# Patient Record
Sex: FEMALE | Race: WHITE | NOT HISPANIC OR LATINO | Employment: OTHER | URBAN - METROPOLITAN AREA
[De-identification: names, ages, dates, MRNs, and addresses within clinical notes are randomized per-mention and may not be internally consistent; named-entity substitution may affect disease eponyms.]

---

## 2023-12-13 ENCOUNTER — HOSPITAL ENCOUNTER (INPATIENT)
Facility: HOSPITAL | Age: 83
LOS: 5 days | Discharge: HOSPICE | DRG: 177 | End: 2023-12-18
Attending: EMERGENCY MEDICINE | Admitting: STUDENT IN AN ORGANIZED HEALTH CARE EDUCATION/TRAINING PROGRAM
Payer: MEDICARE

## 2023-12-13 ENCOUNTER — APPOINTMENT (OUTPATIENT)
Dept: RADIOLOGY | Facility: HOSPITAL | Age: 83
DRG: 177 | End: 2023-12-13
Payer: MEDICARE

## 2023-12-13 ENCOUNTER — ANCILLARY PROCEDURE (OUTPATIENT)
Dept: EMERGENCY MEDICINE | Facility: HOSPITAL | Age: 83
DRG: 177 | End: 2023-12-13
Payer: MEDICARE

## 2023-12-13 DIAGNOSIS — J18.9 MULTIFOCAL PNEUMONIA: Primary | ICD-10-CM

## 2023-12-13 DIAGNOSIS — Z96.1 PSEUDOPHAKIA OF BOTH EYES: ICD-10-CM

## 2023-12-13 DIAGNOSIS — M17.0 PRIMARY OSTEOARTHRITIS OF BOTH KNEES: ICD-10-CM

## 2023-12-13 DIAGNOSIS — J96.01 ACUTE HYPOXEMIC RESPIRATORY FAILURE (MULTI): ICD-10-CM

## 2023-12-13 DIAGNOSIS — K59.09 OTHER CONSTIPATION: ICD-10-CM

## 2023-12-13 LAB
ALBUMIN SERPL BCP-MCNC: 2.9 G/DL (ref 3.4–5)
ALP SERPL-CCNC: 78 U/L (ref 33–136)
ALT SERPL W P-5'-P-CCNC: 15 U/L (ref 7–45)
ANION GAP BLDV CALCULATED.4IONS-SCNC: 14 MMOL/L (ref 10–25)
ANION GAP SERPL CALC-SCNC: 17 MMOL/L (ref 10–20)
AST SERPL W P-5'-P-CCNC: 24 U/L (ref 9–39)
BASE EXCESS BLDV CALC-SCNC: -4 MMOL/L (ref -2–3)
BASOPHILS # BLD MANUAL: 0 X10*3/UL (ref 0–0.1)
BASOPHILS NFR BLD MANUAL: 0 %
BILIRUB SERPL-MCNC: 0.8 MG/DL (ref 0–1.2)
BNP SERPL-MCNC: 200 PG/ML (ref 0–99)
BODY TEMPERATURE: 37 DEGREES CELSIUS
BUN SERPL-MCNC: 39 MG/DL (ref 6–23)
BURR CELLS BLD QL SMEAR: ABNORMAL
CA-I BLDV-SCNC: 1.16 MMOL/L (ref 1.1–1.33)
CALCIUM SERPL-MCNC: 8.8 MG/DL (ref 8.6–10.6)
CARDIAC TROPONIN I PNL SERPL HS: 8 NG/L (ref 0–34)
CHLORIDE BLDV-SCNC: 102 MMOL/L (ref 98–107)
CHLORIDE SERPL-SCNC: 103 MMOL/L (ref 98–107)
CO2 SERPL-SCNC: 20 MMOL/L (ref 21–32)
CREAT SERPL-MCNC: 1.15 MG/DL (ref 0.5–1.05)
EOSINOPHIL # BLD MANUAL: 0 X10*3/UL (ref 0–0.4)
EOSINOPHIL NFR BLD MANUAL: 0 %
ERYTHROCYTE [DISTWIDTH] IN BLOOD BY AUTOMATED COUNT: 15.6 % (ref 11.5–14.5)
FLUAV RNA RESP QL NAA+PROBE: NOT DETECTED
FLUBV RNA RESP QL NAA+PROBE: NOT DETECTED
GFR SERPL CREATININE-BSD FRML MDRD: 47 ML/MIN/1.73M*2
GLUCOSE BLDV-MCNC: 102 MG/DL (ref 74–99)
GLUCOSE SERPL-MCNC: 88 MG/DL (ref 74–99)
HCO3 BLDV-SCNC: 20.8 MMOL/L (ref 22–26)
HCT VFR BLD AUTO: 34.3 % (ref 36–46)
HCT VFR BLD EST: 35 % (ref 36–46)
HGB BLD-MCNC: 12.1 G/DL (ref 12–16)
HGB BLDV-MCNC: 11.8 G/DL (ref 12–16)
HOLD SPECIMEN: NORMAL
IMM GRANULOCYTES # BLD AUTO: 0.33 X10*3/UL (ref 0–0.5)
IMM GRANULOCYTES NFR BLD AUTO: 1.8 % (ref 0–0.9)
INR PPP: 2.2 (ref 0.9–1.1)
LACTATE BLDV-SCNC: 1.9 MMOL/L (ref 0.4–2)
LYMPHOCYTES # BLD MANUAL: 0.44 X10*3/UL (ref 0.8–3)
LYMPHOCYTES NFR BLD MANUAL: 2.4 %
MAGNESIUM SERPL-MCNC: 1.75 MG/DL (ref 1.6–2.4)
MCH RBC QN AUTO: 28.6 PG (ref 26–34)
MCHC RBC AUTO-ENTMCNC: 35.3 G/DL (ref 32–36)
MCV RBC AUTO: 81 FL (ref 80–100)
MONOCYTES # BLD MANUAL: 0 X10*3/UL (ref 0.05–0.8)
MONOCYTES NFR BLD MANUAL: 0 %
NEUTROPHILS # BLD MANUAL: 17.96 X10*3/UL (ref 1.6–5.5)
NEUTS BAND # BLD MANUAL: 4.2 X10*3/UL (ref 0–0.5)
NEUTS BAND NFR BLD MANUAL: 22.8 %
NEUTS SEG # BLD MANUAL: 13.76 X10*3/UL (ref 1.6–5)
NEUTS SEG NFR BLD MANUAL: 74.8 %
NRBC BLD-RTO: 0 /100 WBCS (ref 0–0)
OXYHGB MFR BLDV: 42.4 % (ref 45–75)
PCO2 BLDV: 36 MM HG (ref 41–51)
PH BLDV: 7.37 PH (ref 7.33–7.43)
PHOSPHATE SERPL-MCNC: 4.1 MG/DL (ref 2.5–4.9)
PLATELET # BLD AUTO: 395 X10*3/UL (ref 150–450)
PO2 BLDV: 32 MM HG (ref 35–45)
POTASSIUM BLDV-SCNC: 5.2 MMOL/L (ref 3.5–5.3)
POTASSIUM SERPL-SCNC: 5 MMOL/L (ref 3.5–5.3)
PROT SERPL-MCNC: 6.4 G/DL (ref 6.4–8.2)
PROTHROMBIN TIME: 25.1 SECONDS (ref 9.8–12.8)
RBC # BLD AUTO: 4.23 X10*6/UL (ref 4–5.2)
RBC MORPH BLD: ABNORMAL
SAO2 % BLDV: 43 % (ref 45–75)
SARS-COV-2 RNA RESP QL NAA+PROBE: NOT DETECTED
SODIUM BLDV-SCNC: 132 MMOL/L (ref 136–145)
SODIUM SERPL-SCNC: 135 MMOL/L (ref 136–145)
TOTAL CELLS COUNTED BLD: 123
WBC # BLD AUTO: 18.4 X10*3/UL (ref 4.4–11.3)

## 2023-12-13 PROCEDURE — 83735 ASSAY OF MAGNESIUM: CPT | Performed by: STUDENT IN AN ORGANIZED HEALTH CARE EDUCATION/TRAINING PROGRAM

## 2023-12-13 PROCEDURE — 99291 CRITICAL CARE FIRST HOUR: CPT | Performed by: EMERGENCY MEDICINE

## 2023-12-13 PROCEDURE — 71045 X-RAY EXAM CHEST 1 VIEW: CPT | Mod: FY

## 2023-12-13 PROCEDURE — 2500000004 HC RX 250 GENERAL PHARMACY W/ HCPCS (ALT 636 FOR OP/ED): Performed by: STUDENT IN AN ORGANIZED HEALTH CARE EDUCATION/TRAINING PROGRAM

## 2023-12-13 PROCEDURE — 85027 COMPLETE CBC AUTOMATED: CPT | Performed by: STUDENT IN AN ORGANIZED HEALTH CARE EDUCATION/TRAINING PROGRAM

## 2023-12-13 PROCEDURE — 99285 EMERGENCY DEPT VISIT HI MDM: CPT | Mod: 25 | Performed by: EMERGENCY MEDICINE

## 2023-12-13 PROCEDURE — 2500000004 HC RX 250 GENERAL PHARMACY W/ HCPCS (ALT 636 FOR OP/ED)

## 2023-12-13 PROCEDURE — 99291 CRITICAL CARE FIRST HOUR: CPT | Mod: 25 | Performed by: EMERGENCY MEDICINE

## 2023-12-13 PROCEDURE — 85610 PROTHROMBIN TIME: CPT | Performed by: STUDENT IN AN ORGANIZED HEALTH CARE EDUCATION/TRAINING PROGRAM

## 2023-12-13 PROCEDURE — 83880 ASSAY OF NATRIURETIC PEPTIDE: CPT | Performed by: STUDENT IN AN ORGANIZED HEALTH CARE EDUCATION/TRAINING PROGRAM

## 2023-12-13 PROCEDURE — 94640 AIRWAY INHALATION TREATMENT: CPT

## 2023-12-13 PROCEDURE — 87636 SARSCOV2 & INF A&B AMP PRB: CPT | Performed by: STUDENT IN AN ORGANIZED HEALTH CARE EDUCATION/TRAINING PROGRAM

## 2023-12-13 PROCEDURE — 84484 ASSAY OF TROPONIN QUANT: CPT | Performed by: STUDENT IN AN ORGANIZED HEALTH CARE EDUCATION/TRAINING PROGRAM

## 2023-12-13 PROCEDURE — 5A0935A ASSISTANCE WITH RESPIRATORY VENTILATION, LESS THAN 24 CONSECUTIVE HOURS, HIGH NASAL FLOW/VELOCITY: ICD-10-PCS | Performed by: STUDENT IN AN ORGANIZED HEALTH CARE EDUCATION/TRAINING PROGRAM

## 2023-12-13 PROCEDURE — 2500000005 HC RX 250 GENERAL PHARMACY W/O HCPCS: Performed by: STUDENT IN AN ORGANIZED HEALTH CARE EDUCATION/TRAINING PROGRAM

## 2023-12-13 PROCEDURE — 93005 ELECTROCARDIOGRAM TRACING: CPT

## 2023-12-13 PROCEDURE — 2500000004 HC RX 250 GENERAL PHARMACY W/ HCPCS (ALT 636 FOR OP/ED): Performed by: EMERGENCY MEDICINE

## 2023-12-13 PROCEDURE — 84100 ASSAY OF PHOSPHORUS: CPT | Performed by: STUDENT IN AN ORGANIZED HEALTH CARE EDUCATION/TRAINING PROGRAM

## 2023-12-13 PROCEDURE — 94660 CPAP INITIATION&MGMT: CPT

## 2023-12-13 PROCEDURE — 84132 ASSAY OF SERUM POTASSIUM: CPT | Performed by: STUDENT IN AN ORGANIZED HEALTH CARE EDUCATION/TRAINING PROGRAM

## 2023-12-13 PROCEDURE — 93010 ELECTROCARDIOGRAM REPORT: CPT | Performed by: EMERGENCY MEDICINE

## 2023-12-13 PROCEDURE — 5A09357 ASSISTANCE WITH RESPIRATORY VENTILATION, LESS THAN 24 CONSECUTIVE HOURS, CONTINUOUS POSITIVE AIRWAY PRESSURE: ICD-10-PCS | Performed by: STUDENT IN AN ORGANIZED HEALTH CARE EDUCATION/TRAINING PROGRAM

## 2023-12-13 PROCEDURE — 36415 COLL VENOUS BLD VENIPUNCTURE: CPT | Performed by: STUDENT IN AN ORGANIZED HEALTH CARE EDUCATION/TRAINING PROGRAM

## 2023-12-13 PROCEDURE — 2500000002 HC RX 250 W HCPCS SELF ADMINISTERED DRUGS (ALT 637 FOR MEDICARE OP, ALT 636 FOR OP/ED): Performed by: EMERGENCY MEDICINE

## 2023-12-13 PROCEDURE — 96365 THER/PROPH/DIAG IV INF INIT: CPT | Performed by: EMERGENCY MEDICINE

## 2023-12-13 PROCEDURE — 85007 BL SMEAR W/DIFF WBC COUNT: CPT | Performed by: STUDENT IN AN ORGANIZED HEALTH CARE EDUCATION/TRAINING PROGRAM

## 2023-12-13 PROCEDURE — 2020000001 HC ICU ROOM DAILY

## 2023-12-13 PROCEDURE — 96375 TX/PRO/DX INJ NEW DRUG ADDON: CPT | Performed by: EMERGENCY MEDICINE

## 2023-12-13 PROCEDURE — 71045 X-RAY EXAM CHEST 1 VIEW: CPT | Performed by: STUDENT IN AN ORGANIZED HEALTH CARE EDUCATION/TRAINING PROGRAM

## 2023-12-13 PROCEDURE — 84132 ASSAY OF SERUM POTASSIUM: CPT

## 2023-12-13 RX ORDER — ACETAMINOPHEN 325 MG/1
650 TABLET ORAL ONCE
Status: COMPLETED | OUTPATIENT
Start: 2023-12-13 | End: 2023-12-13

## 2023-12-13 RX ORDER — CEFTRIAXONE 1 G/50ML
1 INJECTION, SOLUTION INTRAVENOUS ONCE
Status: COMPLETED | OUTPATIENT
Start: 2023-12-13 | End: 2023-12-13

## 2023-12-13 RX ORDER — IPRATROPIUM BROMIDE AND ALBUTEROL SULFATE 2.5; .5 MG/3ML; MG/3ML
SOLUTION RESPIRATORY (INHALATION)
Status: COMPLETED
Start: 2023-12-13 | End: 2023-12-13

## 2023-12-13 RX ORDER — FUROSEMIDE 10 MG/ML
INJECTION INTRAMUSCULAR; INTRAVENOUS
Status: COMPLETED
Start: 2023-12-13 | End: 2023-12-13

## 2023-12-13 RX ORDER — IPRATROPIUM BROMIDE AND ALBUTEROL SULFATE 2.5; .5 MG/3ML; MG/3ML
3 SOLUTION RESPIRATORY (INHALATION) ONCE
Status: COMPLETED | OUTPATIENT
Start: 2023-12-13 | End: 2023-12-13

## 2023-12-13 RX ORDER — FUROSEMIDE 10 MG/ML
20 INJECTION INTRAMUSCULAR; INTRAVENOUS ONCE
Status: COMPLETED | OUTPATIENT
Start: 2023-12-13 | End: 2023-12-13

## 2023-12-13 RX ORDER — ONDANSETRON HYDROCHLORIDE 2 MG/ML
4 INJECTION, SOLUTION INTRAVENOUS ONCE AS NEEDED
Status: DISCONTINUED | OUTPATIENT
Start: 2023-12-13 | End: 2023-12-18 | Stop reason: HOSPADM

## 2023-12-13 RX ADMIN — SODIUM CHLORIDE, SODIUM LACTATE, POTASSIUM CHLORIDE, AND CALCIUM CHLORIDE 500 ML: 600; 310; 30; 20 INJECTION, SOLUTION INTRAVENOUS at 22:50

## 2023-12-13 RX ADMIN — Medication 50 L/MIN: at 18:17

## 2023-12-13 RX ADMIN — METHYLPREDNISOLONE SODIUM SUCCINATE 125 MG: 125 INJECTION, POWDER, FOR SOLUTION INTRAMUSCULAR; INTRAVENOUS at 15:21

## 2023-12-13 RX ADMIN — AZITHROMYCIN 500 MG: 500 INJECTION, POWDER, LYOPHILIZED, FOR SOLUTION INTRAVENOUS at 16:31

## 2023-12-13 RX ADMIN — CEFTRIAXONE SODIUM 1 G: 1 INJECTION, SOLUTION INTRAVENOUS at 16:16

## 2023-12-13 RX ADMIN — IPRATROPIUM BROMIDE AND ALBUTEROL SULFATE 3 ML: 2.5; .5 SOLUTION RESPIRATORY (INHALATION) at 15:15

## 2023-12-13 RX ADMIN — FUROSEMIDE 20 MG: 10 INJECTION, SOLUTION INTRAVENOUS at 15:22

## 2023-12-13 RX ADMIN — ACETAMINOPHEN 650 MG: 325 TABLET ORAL at 18:40

## 2023-12-13 RX ADMIN — IPRATROPIUM BROMIDE AND ALBUTEROL SULFATE 3 ML: .5; 3 SOLUTION RESPIRATORY (INHALATION) at 15:15

## 2023-12-13 RX ADMIN — FUROSEMIDE 20 MG: 10 INJECTION INTRAMUSCULAR; INTRAVENOUS at 15:22

## 2023-12-13 SDOH — HEALTH STABILITY: MENTAL HEALTH: HAVE YOU WISHED YOU WERE DEAD OR WISHED YOU COULD GO TO SLEEP AND NOT WAKE UP?: NO

## 2023-12-13 SDOH — HEALTH STABILITY: MENTAL HEALTH: SUICIDE ASSESSMENT: ADULT (C-SSRS)

## 2023-12-13 SDOH — HEALTH STABILITY: MENTAL HEALTH: HAVE YOU ACTUALLY HAD ANY THOUGHTS OF KILLING YOURSELF?: NO

## 2023-12-13 SDOH — HEALTH STABILITY: MENTAL HEALTH: HAVE YOU EVER DONE ANYTHING, STARTED TO DO ANYTHING, OR PREPARED TO DO ANYTHING TO END YOUR LIFE?: NO

## 2023-12-13 ASSESSMENT — LIFESTYLE VARIABLES
HAVE PEOPLE ANNOYED YOU BY CRITICIZING YOUR DRINKING: NO
EVER HAD A DRINK FIRST THING IN THE MORNING TO STEADY YOUR NERVES TO GET RID OF A HANGOVER: NO
REASON UNABLE TO ASSESS: YES
EVER FELT BAD OR GUILTY ABOUT YOUR DRINKING: NO
HAVE YOU EVER FELT YOU SHOULD CUT DOWN ON YOUR DRINKING: NO

## 2023-12-13 ASSESSMENT — COLUMBIA-SUICIDE SEVERITY RATING SCALE - C-SSRS
1. IN THE PAST MONTH, HAVE YOU WISHED YOU WERE DEAD OR WISHED YOU COULD GO TO SLEEP AND NOT WAKE UP?: NO
6. HAVE YOU EVER DONE ANYTHING, STARTED TO DO ANYTHING, OR PREPARED TO DO ANYTHING TO END YOUR LIFE?: NO

## 2023-12-13 ASSESSMENT — PAIN SCALES - GENERAL: PAINLEVEL_OUTOF10: 0 - NO PAIN

## 2023-12-13 ASSESSMENT — PAIN - FUNCTIONAL ASSESSMENT: PAIN_FUNCTIONAL_ASSESSMENT: 0-10

## 2023-12-13 NOTE — ED PROCEDURE NOTE
Procedure  Critical Care    Performed by: Nadeem Rivera MD  Authorized by: Nadeem Rivera MD    Critical care provider statement:     Critical care time (minutes):  30    Critical care was necessary to treat or prevent imminent or life-threatening deterioration of the following conditions:  Respiratory failure    Critical care was time spent personally by me on the following activities:  Ordering and performing treatments and interventions, ordering and review of laboratory studies, development of treatment plan with patient or surrogate, ordering and review of radiographic studies, pulse oximetry, evaluation of patient's response to treatment, re-evaluation of patient's condition, examination of patient, interpretation of cardiac output measurements and obtaining history from patient or surrogate    I assumed direction of critical care for this patient from another provider in my specialty: no                 Nadeem Rivera MD  12/13/23 5024

## 2023-12-13 NOTE — Clinical Note
ED UM Review Complete - Patient Meets Inpatient Criteria  [email or web address]@

## 2023-12-13 NOTE — ED PROVIDER NOTES
CC: Shortness of Breath (SOB fr4om NH with a C/C of SOB/Hx of COPD/PHTN)     HPI:  Patient is a 83-year-old female with history of heart failure, COPD, and pulmonary hypertension presenting to the ED from nursing facility with shortness of breath.  Patient reportedly found to be hypoxic to the 70s at nursing facility.  Patient denies ever needing oxygen.  EMS states that they placed on a nonrebreather and had improvement in his oxygen saturations to 94%.  Patient states she has had a cough but no fever.  Has also not been compliant with her water pills.      Limitations to History: Patient Acuity    Additional History Obtained from: EMS    Records Reviewed: Recent available ED and inpatient notes reviewed in EMR.    PMHx/PSHx:  Per HPI.   - has a past medical history of Acute kidney failure, unspecified (CMS/HCC) (04/20/2019), Adverse effect of other specified systemic anti-infectives and antiparasitics, initial encounter (06/29/2021), Aftercare following joint replacement surgery (06/04/2018), Age-related nuclear cataract, left eye (06/04/2018), Age-related nuclear cataract, right eye (06/04/2018), Bilateral primary osteoarthritis of hip (06/04/2018), Cellulitis of left lower limb (09/30/2021), Cervical disc disorder, unspecified, unspecified cervical region (06/04/2018), Flat foot (pes planus) (acquired), unspecified foot (06/04/2018), Insomnia, unspecified (06/04/2018), Nocturnal polyuria (10/28/2020), Other amnesia (06/04/2018), Other conditions influencing health status (04/20/2019), Other specified anxiety disorders (10/23/2019), Other symptoms and signs involving the nervous system (06/28/2018), Paresthesia of skin (10/11/2020), Personal history of diseases of the blood and blood-forming organs and certain disorders involving the immune mechanism (05/18/2017), Personal history of diseases of the skin and subcutaneous tissue (03/27/2022), Personal history of other diseases of the digestive system (10/23/2019),  Personal history of other diseases of the digestive system (10/23/2019), Personal history of other diseases of the digestive system (07/07/2020), Personal history of other diseases of the nervous system and sense organs (10/05/2020), Personal history of other diseases of the nervous system and sense organs (05/18/2017), Personal history of other infectious and parasitic diseases (04/20/2019), Personal history of other specified conditions (05/21/2019), Personal history of other specified conditions (10/14/2019), Personal history of pneumonia (recurrent) (05/21/2019), Personal history of transient ischemic attack (TIA), and cerebral infarction without residual deficits (05/04/2018), Posterior tibial tendinitis, unspecified leg (03/30/2018), Posterior tibial tendinitis, unspecified leg (06/04/2018), Primary open-angle glaucoma, right eye, moderate stage (01/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), Primary osteoarthritis, unspecified hand (09/25/2019), Radiculopathy, lumbar region (02/05/2019), Strain of muscle(s) and tendon(s) of the rotator cuff of right shoulder, initial encounter (01/19/2021), Transient cerebral ischemic attack, unspecified (12/09/2020), Unilateral primary osteoarthritis, right hip (06/04/2018), Unspecified injury of head, sequela (02/26/2021), Unspecified rotator cuff tear or rupture of right shoulder, not specified as traumatic (05/18/2017), Urgency of urination (06/04/2018), Urinary tract infection, site not specified (06/04/2018), and Urinary tract infection, site not specified (06/04/2018).  - has a past surgical history that includes Lumbar discectomy (09/14/2013); Other surgical history (09/14/2013); Other surgical history (06/06/2017); Other surgical history (09/25/2015); Hysterectomy (09/25/2015); Other surgical history (09/23/2014); Hand surgery (09/23/2014); Colonoscopy (07/07/2015); CT angio abdomen pelvis w and or wo IV IV contrast (11/9/2018); IR angiogram aorta abdomen (4/10/2017); and IR intervention NEURO stent  (4/10/2017).    Medications: Reviewed in EMR. See EMR for complete list of medications and doses.    Allergies:  Penicillins, Ace inhibitors, Cephalexin, Cephalosporins, Donepezil, Gabapentin, Hydroxychloroquine sulfate, Mirabegron, Mirtazapine, Moxifloxacin, Sulfamethoxazole-trimethoprim, Ciprofloxacin, and Methotrexate    Social History:  - Tobacco:  has no history on file for tobacco use.   - Alcohol:  has no history on file for alcohol use.   - Illicit Drugs:  has no history on file for drug use.   ???????????????????????????????????????????????????????????????  Triage Vitals:  T 36.6 °C (97.9 °F)    /82  RR 22  O2 97 % Supplemental oxygen    PHYSICAL EXAM:   VS: As documented in the triage note and EMR flowsheet from this visit were reviewed.  Gen: elderly female on NRB in moderate respiratory distress,  Non-toxic appearing.  Eyes: PERRL, EOMI. Clear scerla.  HENT: NC/AT, Mucosal membranes dry  Neck: Supple, no cervical LAD  Resp: labored breathing on NRB, diffuse coarse breath sounds throughout, scattered expiratory wheeze  CV: tachycardic, RR, nl S1, S2, no murmurs  Abd: Soft, non-distended, non-tender, no rebound or guarding  Ext: mild LE edema, pulses full and equal  Skin: WWP. No systemic rashes or lesions.  MSK: normal muscle bulk, no obvious joint swelling in extremities  Neuro:  AAOx3, speech fluent, MAEx4, no focal deficit  Psych: Maintains eye contact, Appropriate mood and affect  ???????????????????????????????????????????????????????????????  ED Labs/Imaging:   Labs Reviewed   CBC WITH AUTO DIFFERENTIAL - Abnormal       Result Value    WBC 18.4 (*)     nRBC 0.0      RBC 4.23      Hemoglobin 12.1      Hematocrit 34.3 (*)     MCV 81      MCH 28.6      MCHC 35.3      RDW 15.6 (*)     Platelets 395      Immature Granulocytes %, Automated 1.8 (*)     Immature Granulocytes Absolute, Automated 0.33      Narrative:     The previously reported component Neutrophils % is no longer being  reported.  The previously reported component Lymphocytes % is no longer being reported.  The previously reported component Monocytes % is no longer being reported.  The previously   reported component Eosinophils % is no longer being reported.  The previously reported component Basophils % is no longer being reported.  The previously reported component Absolute Neutrophils is no longer being reported.  The previously reported   component Absolute Lymphocytes is no longer being reported.  The previously reported component Absolute Monocytes is no longer being reported.  The previously reported component Absolute Eosinophils is no longer being reported.  The previously reported   component Absolute Basophils is no longer being reported.   COMPREHENSIVE METABOLIC PANEL - Abnormal    Glucose 88      Sodium 135 (*)     Potassium 5.0      Chloride 103      Bicarbonate 20 (*)     Anion Gap 17      Urea Nitrogen 39 (*)     Creatinine 1.15 (*)     eGFR 47 (*)     Calcium 8.8      Albumin 2.9 (*)     Alkaline Phosphatase 78      Total Protein 6.4      AST 24      Bilirubin, Total 0.8      ALT 15     PROTIME-INR - Abnormal    Protime 25.1 (*)     INR 2.2 (*)    B-TYPE NATRIURETIC PEPTIDE - Abnormal     (*)     Narrative:        <100 pg/mL - Heart failure unlikely  100-299 pg/mL - Intermediate probability of acute heart                  failure exacerbation. Correlate with clinical                  context and patient history.    >=300 pg/mL - Heart Failure likely. Correlate with clinical                  context and patient history.     Biotin interference may cause falsely decreased results. Patients taking a Biotin dose of up to 5 mg/day should refrain from taking Biotin for 24 hours before sample  collection. Providers may contact their local laboratory for further information.   CBC - Abnormal    WBC 22.8 (*)     nRBC 0.0      RBC 3.93 (*)     Hemoglobin 11.1 (*)     Hematocrit 32.1 (*)     MCV 82      MCH 28.2       MCHC 34.6      RDW 15.9 (*)     Platelets 402     PROCALCITONIN - Abnormal    Procalcitonin 2.83 (*)     Narrative:     Procalcitonin (PCT) results measured serially can  aid in decision-making for antibiotic discontinuation in  patients with suspected or confirmed sepsis in conjunction  with additional clinical information. Antibiotic  discontinuation may be considered with a change in PCT of  >80% from the peak result or when PCT falls below 0.50 ng/mL.    Procalcitonin results should not be used in isolation but  should be interpreted in conjunction with additional clinical  and laboratory findings. Procalcitonin results should not be  used to guide the initiation of antibiotic therapy.    Falsely low PCT values in the presence of bacterial infection  may occur in early infection, with atypical pathogens,  localized infections, and subacute infectious endocarditis.    Falsely elevated results outside of severe bacterial  infection/sepsis may be seen in patients with renal failure  or insufficiency, severe trauma or burns, recent major  abdominal/cardiac surgery, acute multi-organ failure, rarely  in patients with medullary thyroid carcinoma and rare  neuroendocrine tumors, and non-specific interfering antibodies  (heterophile antibodies, rheumatoid factor, human anti-mouse  antibodies (HAMA), etc).    Performance of the PCT test in pediatric patients (<19yo),  pregnant women, immunocompromised patients, and patients on  immunomodulatory medications has not been evaluated.   COMPREHENSIVE METABOLIC PANEL - Abnormal    Glucose 97      Sodium 138      Potassium 4.3      Chloride 101      Bicarbonate 22      Anion Gap 19      Urea Nitrogen 40 (*)     Creatinine 1.08 (*)     eGFR 51 (*)     Calcium 8.0 (*)     Albumin 2.6 (*)     Alkaline Phosphatase 66      Total Protein 5.5 (*)     AST 51 (*)     Bilirubin, Total 0.6      ALT 23     BLOOD GAS VENOUS FULL PANEL - Abnormal    POCT pH, Venous 7.40      POCT pCO2,  Venous 32 (*)     POCT pO2, Venous 62 (*)     POCT SO2, Venous 90 (*)     POCT Oxy Hemoglobin, Venous 89.8 (*)     POCT Hematocrit Calculated, Venous 32.0 (*)     POCT Sodium, Venous 132 (*)     POCT Potassium, Venous 3.8      POCT Chloride, Venous 104      POCT Ionized Calicum, Venous 1.01 (*)     POCT Glucose, Venous 140 (*)     POCT Lactate, Venous 1.5      POCT Base Excess, Venous -4.3 (*)     POCT HCO3 Calculated, Venous 19.8 (*)     POCT Hemoglobin, Venous 10.5 (*)     POCT Anion Gap, Venous 12.0      Patient Temperature 37.0      FiO2 0     BLOOD GAS VENOUS FULL PANEL UNSOLICITED - Abnormal    POCT pH, Venous 7.37      POCT pCO2, Venous 36 (*)     POCT pO2, Venous 32 (*)     POCT SO2, Venous 43 (*)     POCT Oxy Hemoglobin, Venous 42.4 (*)     POCT Hematocrit Calculated, Venous 35.0 (*)     POCT Sodium, Venous 132 (*)     POCT Potassium, Venous 5.2      POCT Chloride, Venous 102      POCT Ionized Calicum, Venous 1.16      POCT Glucose, Venous 102 (*)     POCT Lactate, Venous 1.9      POCT Base Excess, Venous -4.0 (*)     POCT HCO3 Calculated, Venous 20.8 (*)     POCT Hemoglobin, Venous 11.8 (*)     POCT Anion Gap, Venous 14.0      Patient Temperature 37.0     MANUAL DIFFERENTIAL - Abnormal    Neutrophils %, Manual 74.8      Bands %, Manual 22.8      Lymphocytes %, Manual 2.4      Monocytes %, Manual 0.0      Eosinophils %, Manual 0.0      Basophils %, Manual 0.0      Seg Neutrophils Absolute, Manual 13.76 (*)     Bands Absolute, Manual 4.20 (*)     Lymphocytes Absolute, Manual 0.44 (*)     Monocytes Absolute, Manual 0.00 (*)     Eosinophils Absolute, Manual 0.00      Basophils Absolute, Manual 0.00      Total Cells Counted 123      Neutrophils Absolute, Manual 17.96 (*)     RBC Morphology See Below      Dania Cells Few     PHOSPHORUS - Normal    Phosphorus 4.1     MAGNESIUM - Normal    Magnesium 1.75     TROPONIN I, HIGH SENSITIVITY - Normal    Troponin I, High Sensitivity 8      Narrative:     Less than 99th  percentile of normal range cutoff-  Female and children under 18 years old <35 ng/L; Male <54 ng/L: Negative  Repeat testing should be performed if clinically indicated.     Female and children under 18 years old  ng/L; Male  ng/L:  Consistent with possible cardiac damage and possible increased clinical   risk. Serial measurements may help to assess extent of myocardial damage.     >120 ng/L: Consistent with cardiac damage, increased clinical risk and  myocardial infarction. Serial measurements may help assess extent of   myocardial damage.      NOTE: Children less than 1 year old may have higher baseline troponin   levels and results should be interpreted in conjunction with the overall   clinical context.    NOTE: Troponin I testing is performed using a different   testing methodology at [facility] than at other   [facility]. Direct result comparisons should only   be made within the same method.     SARS-COV-2 PCR, SYMPTOMATIC - Normal    Coronavirus 2019, PCR Not Detected      Narrative:     This assay has received FDA Emergency Use Authorization (EUA) and is only authorized for the duration of time that circumstances exist to justify the authorization of the emergency use of in vitro diagnostic tests for the detection of SARS-CoV-2 virus and/or diagnosis of COVID-19 infection under section 564(b)(1) of the Act, 21 U.S.C. 360bbb-3(b)(1). This assay is an in vitro diagnostic nucleic acid amplification test for the qualitative detection of SARS-CoV-2 from nasopharyngeal specimens and has been validated for use at [facility]. Negative results do not preclude COVID-19 infections and should not be used as the sole basis for diagnosis, treatment, or other management decisions.     INFLUENZA A AND B PCR - Normal    Flu A Result Not Detected      Flu B Result Not Detected      Narrative:     This assay is an in vitro diagnostic multiplex nucleic acid amplification  test for the detection and discrimination of Influenza A & B from nasopharyngeal specimens, and has been validated for use at [facility]. Negative results do not preclude Influenza A/B infections, and should not be used as the sole basis for diagnosis, treatment, or other management decisions. If Influenza A/B and RSV PCR results are negative, testing for Parainfluenza virus, Adenovirus and Metapneumovirus is routinely performed for [facility] pediatric oncology and intensive care inpatients, and is available on other patients by placing an add-on request.   MAGNESIUM - Normal    Magnesium 1.81     PHOSPHORUS - Normal    Phosphorus 4.7     LEGIONELLA ANTIGEN, URINE   STREPTOCOCCUS PNEUMONIAE ANTIGEN, URINE   LEGIONELLA ANTIGEN, URINE   STAPHYLOCOCCUS AUREUS/MRSA COLONIZATION, CULTURE   RESPIRATORY CULTURE/SMEAR   STREPTOCOCCUS PNEUMONIAE ANTIGEN, URINE   LEGIONELLA ANTIGEN, URINE   URINALYSIS WITH REFLEX MICROSCOPIC AND CULTURE    Narrative:     The following orders were created for panel order Urinalysis with Reflex Microscopic and Culture.  Procedure                               Abnormality         Status                     ---------                               -----------         ------                     Urinalysis with Reflex M...[[id number]]                                                 Extra Urine Gray Tube[[id number]]                                                         Please view results for these tests on the individual orders.   URINALYSIS WITH REFLEX MICROSCOPIC AND CULTURE   EXTRA URINE GRAY TUBE   BLOOD GAS ARTERIAL FULL PANEL   URINALYSIS WITH REFLEX MICROSCOPIC AND CULTURE   RESPIRATORY VIRAL PANEL     XR chest 1 view   Final Result   Multifocal airspace opacities with air bronchograms concerning for   multifocal pneumonia.        Small left effusion with associated adjacent   atelectasis/consolidation.        I personally reviewed the images/study and I agree with the  findings   as stated above by resident physician, Dr. Alexander Penn. The   study was interpreted at [facility] in [facility].        MACRO:   none.        Signed by: Elvin Farrar 12/13/2023 5:25 PM   Dictation workstation:   [id number]      FL modified barium swallow study    (Results Pending)         ED Course & MDM   ED Course as of 12/14/23 1200   Wed Dec 13, 2023   2246 I independently interpreted the EKG:  Regular rate. Regular rhythm. Normal axis.   Normal VT, QRS, and Qtc intervals.   No ST segment elevations, depressions, or T wave inversions.  Impression: NSR, no STEMI. [KR]      ED Course User Index  [KR] Kassy Abraham MD         Diagnoses as of 12/14/23 1200   Multifocal pneumonia           Medical Decision Making:  Differential diagnosis: CHF exacerbation, COPD exacerbation, pneumonia, viral illness, worsening pulmonary hypertension    82yo female with above hx presenting in respiratory distress and hypoxic from nursing facility. Patient requiring NRB to maintain oxygenation. Lungs with coarse rales/crackles throughout. POCUS with diffuse B-lines concerning for pulmonary edema, concerning for HF exacerbation. Pt also had mild wheezes in setting of COPD given steroids and duoneb and placed on BiPap. Pt also given lasix for diuresis. CXR obtaining looks like component of pulmonary edema but also multifocal opacities concerning for pneumonia. COVID/influenza negative. Patient transitioned to HFNC and antibiotics administered. Troponin and EKGs not consistent with ACS. Patient discussed with MICU who accepts the patient for admission. Attempted to call contact information to confirm code status but no answer.       Social Determinants Limiting Care:  None identified    Disposition:  MICU.   As a result of their workup, the patient will require admission to the hospital.  The patient was informed of their diagnosis.  Patient was given the opportunity to ask  questions and answered them.  Patient agreed to be admitted to the hospital.    Patient seen and discussed with attending physician.    Kassy Abraham MD PGY3  Emergency Medicine      Procedures ? SmartLinks last updated 12/14/2023 12:00 PM        Kassy Abraham MD  Resident  12/14/23 1200    -------------------------------------    This patient was seen by the resident physician. I have seen and examined the patient, agree with the workup, evaluation, management and diagnosis. The care plan has been discussed and I concur.    My assessment reveals the following:    HPI:  Patient is a 84 y/o female presenting via [facility]D from [facility]TC[facility] for SOB. Hypoxic to 70s upon EMS arrival. Pulse ox increased to low 90s with NRB at 15L. H/o COPD, CHF, and pulm HTN. No CP. No F/C. Does report cough. No N/V/abd pain. Not compliant with water pills. Patient requires my immediate attention.    Limitations to History: SOB    Additional History Obtained from: ECFD    PE:  Vital signs reviewed in nursing triage note, EMR flowsheets, and at patient's bedside  GEN: Patient is awake, alert, calm, cooperative, and in mild to moderate respiratory distress.  HEAD: Normocephalic and atraumatic.  EYES: Anicteric sclera.  MOUTH: Mucous membranes moist.  CV: Regular rate and rhythm. (+) s1/s2. No murmurs/rubs/gallops.  PULM: CTAB. Rales and crackles bilaterally.   GI: Soft, non-tender, non-distended without rebound or guarding.  EXT: No deformities noted.  NEURO: Moves all extremities.   SKIN: Warm, dry. No erythema or ecchymosis.    Medical Decision Making:  - Monitor  - O2  - IV  - Labs  - Nebs  - Solumedrol IV  - BiPAP  - Lasix IV  - CXR  - Abx  - Admit    EKG: EKG independently reviewed by the attending ED physician, and I and concur with the resident's/advanced practice provider's interpretation. Sinus rhythm at 97 bpm, normal axis, normal intervals, no ST or T wave changes. Similar to old EKG on 2/19/22.    Differential Diagnoses Considered: CHF  exacerbation, COPD exacerbation, PNA, Viral illness    Chronic Medical Conditions Significantly Affecting Care: CHF, COPD, Pulm HTN    External Records Reviewed: I reviewed recent and relevant outside records including: Transfer forms from LTCF    Independent Interpretation of Studies:  I independently interpreted: CXR shows bilateral infiltrates    Escalation of Care:  Appropriate for inpatient management    MD Nadeem Avina MD  12/15/23 7690

## 2023-12-14 ENCOUNTER — APPOINTMENT (OUTPATIENT)
Dept: RADIOLOGY | Facility: HOSPITAL | Age: 83
DRG: 177 | End: 2023-12-14
Payer: MEDICARE

## 2023-12-14 LAB
ALBUMIN SERPL BCP-MCNC: 2.6 G/DL (ref 3.4–5)
ALP SERPL-CCNC: 66 U/L (ref 33–136)
ALT SERPL W P-5'-P-CCNC: 23 U/L (ref 7–45)
ANION GAP BLDV CALCULATED.4IONS-SCNC: 12 MMOL/L (ref 10–25)
ANION GAP SERPL CALC-SCNC: 19 MMOL/L (ref 10–20)
APPEARANCE UR: ABNORMAL
AST SERPL W P-5'-P-CCNC: 51 U/L (ref 9–39)
ATRIAL RATE: 97 BPM
BASE EXCESS BLDV CALC-SCNC: -4.3 MMOL/L (ref -2–3)
BILIRUB SERPL-MCNC: 0.6 MG/DL (ref 0–1.2)
BILIRUB UR STRIP.AUTO-MCNC: NEGATIVE MG/DL
BODY TEMPERATURE: 37 DEGREES CELSIUS
BUN SERPL-MCNC: 40 MG/DL (ref 6–23)
CA-I BLDV-SCNC: 1.01 MMOL/L (ref 1.1–1.33)
CALCIUM SERPL-MCNC: 8 MG/DL (ref 8.6–10.6)
CHLORIDE BLDV-SCNC: 104 MMOL/L (ref 98–107)
CHLORIDE SERPL-SCNC: 101 MMOL/L (ref 98–107)
CO2 SERPL-SCNC: 22 MMOL/L (ref 21–32)
COLOR UR: YELLOW
CREAT SERPL-MCNC: 1.08 MG/DL (ref 0.5–1.05)
ERYTHROCYTE [DISTWIDTH] IN BLOOD BY AUTOMATED COUNT: 15.9 % (ref 11.5–14.5)
GFR SERPL CREATININE-BSD FRML MDRD: 51 ML/MIN/1.73M*2
GLUCOSE BLDV-MCNC: 140 MG/DL (ref 74–99)
GLUCOSE SERPL-MCNC: 97 MG/DL (ref 74–99)
GLUCOSE UR STRIP.AUTO-MCNC: NEGATIVE MG/DL
HCO3 BLDV-SCNC: 19.8 MMOL/L (ref 22–26)
HCT VFR BLD AUTO: 32.1 % (ref 36–46)
HCT VFR BLD EST: 32 % (ref 36–46)
HGB BLD-MCNC: 11.1 G/DL (ref 12–16)
HGB BLDV-MCNC: 10.5 G/DL (ref 12–16)
HOLD SPECIMEN: NORMAL
HOLD SPECIMEN: NORMAL
INHALED O2 CONCENTRATION: 0 %
KETONES UR STRIP.AUTO-MCNC: NEGATIVE MG/DL
LACTATE BLDV-SCNC: 1.5 MMOL/L (ref 0.4–2)
LEUKOCYTE ESTERASE UR QL STRIP.AUTO: NEGATIVE
MAGNESIUM SERPL-MCNC: 1.81 MG/DL (ref 1.6–2.4)
MCH RBC QN AUTO: 28.2 PG (ref 26–34)
MCHC RBC AUTO-ENTMCNC: 34.6 G/DL (ref 32–36)
MCV RBC AUTO: 82 FL (ref 80–100)
NITRITE UR QL STRIP.AUTO: NEGATIVE
NRBC BLD-RTO: 0 /100 WBCS (ref 0–0)
OXYHGB MFR BLDV: 89.8 % (ref 45–75)
P AXIS: 66 DEGREES
P OFFSET: 209 MS
P ONSET: 160 MS
PCO2 BLDV: 32 MM HG (ref 41–51)
PH BLDV: 7.4 PH (ref 7.33–7.43)
PH UR STRIP.AUTO: 5 [PH]
PHOSPHATE SERPL-MCNC: 4.7 MG/DL (ref 2.5–4.9)
PLATELET # BLD AUTO: 402 X10*3/UL (ref 150–450)
PO2 BLDV: 62 MM HG (ref 35–45)
POTASSIUM BLDV-SCNC: 3.8 MMOL/L (ref 3.5–5.3)
POTASSIUM SERPL-SCNC: 4.3 MMOL/L (ref 3.5–5.3)
PR INTERVAL: 130 MS
PROCALCITONIN SERPL-MCNC: 2.83 NG/ML
PROT SERPL-MCNC: 5.5 G/DL (ref 6.4–8.2)
PROT UR STRIP.AUTO-MCNC: NEGATIVE MG/DL
Q ONSET: 225 MS
QRS COUNT: 16 BEATS
QRS DURATION: 66 MS
QT INTERVAL: 324 MS
QTC CALCULATION(BAZETT): 411 MS
QTC FREDERICIA: 380 MS
R AXIS: 0 DEGREES
RBC # BLD AUTO: 3.93 X10*6/UL (ref 4–5.2)
RBC # UR STRIP.AUTO: NEGATIVE /UL
SAO2 % BLDV: 90 % (ref 45–75)
SODIUM BLDV-SCNC: 132 MMOL/L (ref 136–145)
SODIUM SERPL-SCNC: 138 MMOL/L (ref 136–145)
SP GR UR STRIP.AUTO: 1.02
T AXIS: 71 DEGREES
T OFFSET: 387 MS
UROBILINOGEN UR STRIP.AUTO-MCNC: <2 MG/DL
VENTRICULAR RATE: 97 BPM
WBC # BLD AUTO: 22.8 X10*3/UL (ref 4.4–11.3)

## 2023-12-14 PROCEDURE — 2500000004 HC RX 250 GENERAL PHARMACY W/ HCPCS (ALT 636 FOR OP/ED): Performed by: STUDENT IN AN ORGANIZED HEALTH CARE EDUCATION/TRAINING PROGRAM

## 2023-12-14 PROCEDURE — 94760 N-INVAS EAR/PLS OXIMETRY 1: CPT

## 2023-12-14 PROCEDURE — 80053 COMPREHEN METABOLIC PANEL: CPT | Performed by: STUDENT IN AN ORGANIZED HEALTH CARE EDUCATION/TRAINING PROGRAM

## 2023-12-14 PROCEDURE — 96372 THER/PROPH/DIAG INJ SC/IM: CPT | Performed by: STUDENT IN AN ORGANIZED HEALTH CARE EDUCATION/TRAINING PROGRAM

## 2023-12-14 PROCEDURE — 83735 ASSAY OF MAGNESIUM: CPT | Performed by: STUDENT IN AN ORGANIZED HEALTH CARE EDUCATION/TRAINING PROGRAM

## 2023-12-14 PROCEDURE — 87899 AGENT NOS ASSAY W/OPTIC: CPT | Performed by: STUDENT IN AN ORGANIZED HEALTH CARE EDUCATION/TRAINING PROGRAM

## 2023-12-14 PROCEDURE — 2500000001 HC RX 250 WO HCPCS SELF ADMINISTERED DRUGS (ALT 637 FOR MEDICARE OP): Performed by: STUDENT IN AN ORGANIZED HEALTH CARE EDUCATION/TRAINING PROGRAM

## 2023-12-14 PROCEDURE — 87449 NOS EACH ORGANISM AG IA: CPT | Performed by: STUDENT IN AN ORGANIZED HEALTH CARE EDUCATION/TRAINING PROGRAM

## 2023-12-14 PROCEDURE — 2500000004 HC RX 250 GENERAL PHARMACY W/ HCPCS (ALT 636 FOR OP/ED)

## 2023-12-14 PROCEDURE — 99233 SBSQ HOSP IP/OBS HIGH 50: CPT

## 2023-12-14 PROCEDURE — 92610 EVALUATE SWALLOWING FUNCTION: CPT | Mod: GN

## 2023-12-14 PROCEDURE — 81003 URINALYSIS AUTO W/O SCOPE: CPT | Performed by: STUDENT IN AN ORGANIZED HEALTH CARE EDUCATION/TRAINING PROGRAM

## 2023-12-14 PROCEDURE — 85027 COMPLETE CBC AUTOMATED: CPT | Performed by: STUDENT IN AN ORGANIZED HEALTH CARE EDUCATION/TRAINING PROGRAM

## 2023-12-14 PROCEDURE — 87205 SMEAR GRAM STAIN: CPT | Performed by: STUDENT IN AN ORGANIZED HEALTH CARE EDUCATION/TRAINING PROGRAM

## 2023-12-14 PROCEDURE — 92611 MOTION FLUOROSCOPY/SWALLOW: CPT | Mod: GN

## 2023-12-14 PROCEDURE — 94667 MNPJ CHEST WALL 1ST: CPT

## 2023-12-14 PROCEDURE — 97162 PT EVAL MOD COMPLEX 30 MIN: CPT | Mod: GP

## 2023-12-14 PROCEDURE — 36415 COLL VENOUS BLD VENIPUNCTURE: CPT | Performed by: STUDENT IN AN ORGANIZED HEALTH CARE EDUCATION/TRAINING PROGRAM

## 2023-12-14 PROCEDURE — 74230 X-RAY XM SWLNG FUNCJ C+: CPT

## 2023-12-14 PROCEDURE — 87081 CULTURE SCREEN ONLY: CPT | Performed by: STUDENT IN AN ORGANIZED HEALTH CARE EDUCATION/TRAINING PROGRAM

## 2023-12-14 PROCEDURE — 74230 X-RAY XM SWLNG FUNCJ C+: CPT | Performed by: STUDENT IN AN ORGANIZED HEALTH CARE EDUCATION/TRAINING PROGRAM

## 2023-12-14 PROCEDURE — 36415 COLL VENOUS BLD VENIPUNCTURE: CPT

## 2023-12-14 PROCEDURE — 1100000001 HC PRIVATE ROOM DAILY

## 2023-12-14 PROCEDURE — 84100 ASSAY OF PHOSPHORUS: CPT | Performed by: STUDENT IN AN ORGANIZED HEALTH CARE EDUCATION/TRAINING PROGRAM

## 2023-12-14 PROCEDURE — 84132 ASSAY OF SERUM POTASSIUM: CPT

## 2023-12-14 PROCEDURE — 99291 CRITICAL CARE FIRST HOUR: CPT | Performed by: STUDENT IN AN ORGANIZED HEALTH CARE EDUCATION/TRAINING PROGRAM

## 2023-12-14 PROCEDURE — 84145 PROCALCITONIN (PCT): CPT | Performed by: STUDENT IN AN ORGANIZED HEALTH CARE EDUCATION/TRAINING PROGRAM

## 2023-12-14 RX ORDER — LIDOCAINE 30 MG/G
1 CREAM TOPICAL EVERY 8 HOURS PRN
COMMUNITY

## 2023-12-14 RX ORDER — ATORVASTATIN CALCIUM 40 MG/1
40 TABLET, FILM COATED ORAL NIGHTLY
Status: ON HOLD | COMMUNITY
End: 2023-12-18 | Stop reason: SDUPTHER

## 2023-12-14 RX ORDER — LEVOTHYROXINE SODIUM 137 UG/1
137 TABLET ORAL DAILY
Status: DISCONTINUED | OUTPATIENT
Start: 2023-12-14 | End: 2023-12-14

## 2023-12-14 RX ORDER — SERTRALINE HYDROCHLORIDE 50 MG/1
50 TABLET, FILM COATED ORAL DAILY
Status: DISCONTINUED | OUTPATIENT
Start: 2023-12-14 | End: 2023-12-18 | Stop reason: HOSPADM

## 2023-12-14 RX ORDER — AMLODIPINE BESYLATE 2.5 MG/1
2.5 TABLET ORAL DAILY
Status: ON HOLD | COMMUNITY
End: 2023-12-18 | Stop reason: SDUPTHER

## 2023-12-14 RX ORDER — ATORVASTATIN CALCIUM 80 MG/1
80 TABLET, FILM COATED ORAL NIGHTLY
Status: DISCONTINUED | OUTPATIENT
Start: 2023-12-14 | End: 2023-12-18 | Stop reason: HOSPADM

## 2023-12-14 RX ORDER — CELECOXIB 200 MG/1
200 CAPSULE ORAL EVERY MORNING
Status: ON HOLD | COMMUNITY
End: 2023-12-18 | Stop reason: SDUPTHER

## 2023-12-14 RX ORDER — LATANOPROST 50 UG/ML
1 SOLUTION/ DROPS OPHTHALMIC NIGHTLY
Status: DISCONTINUED | OUTPATIENT
Start: 2023-12-14 | End: 2023-12-18 | Stop reason: HOSPADM

## 2023-12-14 RX ORDER — ACETAMINOPHEN AND CODEINE PHOSPHATE 300; 30 MG/1; MG/1
2 TABLET ORAL EVERY 12 HOURS PRN
Status: ON HOLD | COMMUNITY
End: 2023-12-18 | Stop reason: SDUPTHER

## 2023-12-14 RX ORDER — DOCUSATE SODIUM 100 MG/1
100 CAPSULE, LIQUID FILLED ORAL 2 TIMES DAILY
Status: DISCONTINUED | OUTPATIENT
Start: 2023-12-14 | End: 2023-12-18 | Stop reason: HOSPADM

## 2023-12-14 RX ORDER — EZETIMIBE 10 MG/1
10 TABLET ORAL NIGHTLY
Status: DISCONTINUED | OUTPATIENT
Start: 2023-12-14 | End: 2023-12-18 | Stop reason: HOSPADM

## 2023-12-14 RX ORDER — CEFTRIAXONE 1 G/50ML
1 INJECTION, SOLUTION INTRAVENOUS EVERY 24 HOURS
Status: COMPLETED | OUTPATIENT
Start: 2023-12-14 | End: 2023-12-18

## 2023-12-14 RX ORDER — CALCIUM CARBONATE 300MG(750)
400 TABLET,CHEWABLE ORAL DAILY
COMMUNITY
End: 2023-12-18 | Stop reason: HOSPADM

## 2023-12-14 RX ORDER — TALC
3 POWDER (GRAM) TOPICAL NIGHTLY
COMMUNITY

## 2023-12-14 RX ORDER — EZETIMIBE 10 MG/1
10 TABLET ORAL NIGHTLY
COMMUNITY
End: 2023-12-18 | Stop reason: HOSPADM

## 2023-12-14 RX ORDER — PREDNISONE 20 MG/1
40 TABLET ORAL DAILY
Status: DISPENSED | OUTPATIENT
Start: 2023-12-14 | End: 2023-12-18

## 2023-12-14 RX ORDER — PETROLATUM 420 MG/G
OINTMENT TOPICAL
Status: DISPENSED
Start: 2023-12-14 | End: 2023-12-15

## 2023-12-14 RX ORDER — BUPROPION HYDROCHLORIDE 150 MG/1
150 TABLET ORAL EVERY 12 HOURS
Status: ON HOLD | COMMUNITY
End: 2023-12-18 | Stop reason: SDUPTHER

## 2023-12-14 RX ORDER — POLYETHYLENE GLYCOL 3350 17 G/17G
17 POWDER, FOR SOLUTION ORAL DAILY
COMMUNITY
End: 2023-12-18 | Stop reason: HOSPADM

## 2023-12-14 RX ORDER — LEVOTHYROXINE SODIUM 125 UG/1
125 TABLET ORAL DAILY
COMMUNITY

## 2023-12-14 RX ORDER — ACETAMINOPHEN 325 MG/1
650 TABLET ORAL EVERY 4 HOURS PRN
Status: ON HOLD | COMMUNITY
End: 2023-12-18 | Stop reason: SDUPTHER

## 2023-12-14 RX ORDER — TALC
3 POWDER (GRAM) TOPICAL NIGHTLY
Status: DISCONTINUED | OUTPATIENT
Start: 2023-12-14 | End: 2023-12-18 | Stop reason: HOSPADM

## 2023-12-14 RX ORDER — TIOTROPIUM BROMIDE 18 UG/1
1 CAPSULE ORAL; RESPIRATORY (INHALATION) DAILY
COMMUNITY

## 2023-12-14 RX ORDER — FLUTICASONE PROPIONATE AND SALMETEROL 500; 50 UG/1; UG/1
1 POWDER RESPIRATORY (INHALATION) 2 TIMES DAILY
Status: ON HOLD | COMMUNITY
End: 2023-12-18 | Stop reason: SDUPTHER

## 2023-12-14 RX ORDER — PANTOPRAZOLE SODIUM 40 MG/1
40 TABLET, DELAYED RELEASE ORAL
Status: DISCONTINUED | OUTPATIENT
Start: 2023-12-14 | End: 2023-12-18 | Stop reason: HOSPADM

## 2023-12-14 RX ORDER — DULOXETIN HYDROCHLORIDE 20 MG/1
20 CAPSULE, DELAYED RELEASE ORAL DAILY
COMMUNITY

## 2023-12-14 RX ORDER — CEFTRIAXONE 1 G/50ML
1 INJECTION, SOLUTION INTRAVENOUS DAILY
Status: CANCELLED | OUTPATIENT
Start: 2023-12-14 | End: 2023-12-18

## 2023-12-14 RX ORDER — CEFTRIAXONE 2 G/50ML
2 INJECTION, SOLUTION INTRAVENOUS EVERY 24 HOURS
Status: DISCONTINUED | OUTPATIENT
Start: 2023-12-14 | End: 2023-12-14

## 2023-12-14 RX ORDER — POTASSIUM CHLORIDE 750 MG/1
20 TABLET, FILM COATED, EXTENDED RELEASE ORAL DAILY
COMMUNITY
End: 2023-12-18 | Stop reason: HOSPADM

## 2023-12-14 RX ORDER — DOCUSATE SODIUM 100 MG/1
100 CAPSULE, LIQUID FILLED ORAL 2 TIMES DAILY
Status: ON HOLD | COMMUNITY
End: 2023-12-18 | Stop reason: SDUPTHER

## 2023-12-14 RX ORDER — IPRATROPIUM BROMIDE AND ALBUTEROL SULFATE 2.5; .5 MG/3ML; MG/3ML
3 SOLUTION RESPIRATORY (INHALATION) EVERY 6 HOURS PRN
Status: DISCONTINUED | OUTPATIENT
Start: 2023-12-14 | End: 2023-12-18 | Stop reason: HOSPADM

## 2023-12-14 RX ORDER — ACETAMINOPHEN AND CODEINE PHOSPHATE 300; 30 MG/1; MG/1
1 TABLET ORAL EVERY 12 HOURS PRN
Status: ON HOLD | COMMUNITY
End: 2023-12-18 | Stop reason: SDUPTHER

## 2023-12-14 RX ORDER — SERTRALINE HYDROCHLORIDE 100 MG/1
200 TABLET, FILM COATED ORAL DAILY
Status: DISCONTINUED | OUTPATIENT
Start: 2023-12-14 | End: 2023-12-14

## 2023-12-14 RX ORDER — FESOTERODINE FUMARATE 8 MG/1
1 TABLET, FILM COATED, EXTENDED RELEASE ORAL NIGHTLY
COMMUNITY

## 2023-12-14 RX ORDER — SERTRALINE HYDROCHLORIDE 50 MG/1
50 TABLET, FILM COATED ORAL DAILY
COMMUNITY

## 2023-12-14 RX ORDER — FUROSEMIDE 20 MG/1
20 TABLET ORAL DAILY
COMMUNITY
End: 2023-12-18 | Stop reason: HOSPADM

## 2023-12-14 RX ORDER — BIMATOPROST 0.1 MG/ML
1 SOLUTION/ DROPS OPHTHALMIC NIGHTLY
Status: ON HOLD | COMMUNITY
End: 2023-12-18 | Stop reason: SDUPTHER

## 2023-12-14 RX ORDER — ENOXAPARIN SODIUM 100 MG/ML
30 INJECTION SUBCUTANEOUS EVERY 24 HOURS
Status: DISCONTINUED | OUTPATIENT
Start: 2023-12-14 | End: 2023-12-18 | Stop reason: HOSPADM

## 2023-12-14 RX ORDER — OMEPRAZOLE 40 MG/1
40 CAPSULE, DELAYED RELEASE ORAL 2 TIMES DAILY
COMMUNITY

## 2023-12-14 RX ORDER — ESTRADIOL 0.5 MG/1
0.5 TABLET ORAL EVERY MORNING
COMMUNITY
End: 2023-12-18 | Stop reason: HOSPADM

## 2023-12-14 RX ADMIN — CEFTRIAXONE SODIUM 1 G: 1 INJECTION, SOLUTION INTRAVENOUS at 15:45

## 2023-12-14 RX ADMIN — SERTRALINE 50 MG: 50 TABLET, FILM COATED ORAL at 10:18

## 2023-12-14 RX ADMIN — PREDNISONE 40 MG: 10 TABLET ORAL at 10:18

## 2023-12-14 RX ADMIN — DOCUSATE SODIUM 100 MG: 100 CAPSULE, LIQUID FILLED ORAL at 10:18

## 2023-12-14 RX ADMIN — AZITHROMYCIN 500 MG: 500 INJECTION, POWDER, LYOPHILIZED, FOR SOLUTION INTRAVENOUS at 16:49

## 2023-12-14 RX ADMIN — BARIUM SULFATE 10 ML: 0.81 POWDER, FOR SUSPENSION ORAL at 13:48

## 2023-12-14 RX ADMIN — BARIUM SULFATE 50 ML: 400 SUSPENSION ORAL at 13:48

## 2023-12-14 RX ADMIN — PANTOPRAZOLE SODIUM 40 MG: 40 TABLET, DELAYED RELEASE ORAL at 10:18

## 2023-12-14 RX ADMIN — ENOXAPARIN SODIUM 30 MG: 30 INJECTION SUBCUTANEOUS at 06:15

## 2023-12-14 RX ADMIN — BARIUM SULFATE 20 ML: 400 PASTE ORAL at 13:47

## 2023-12-14 RX ADMIN — AZITHROMYCIN 500 MG: 500 INJECTION, POWDER, LYOPHILIZED, FOR SOLUTION INTRAVENOUS at 06:35

## 2023-12-14 SDOH — HEALTH STABILITY: MENTAL HEALTH: HOW OFTEN DO YOU HAVE 6 OR MORE DRINKS ON ONE OCCASION?: NEVER

## 2023-12-14 SDOH — ECONOMIC STABILITY: INCOME INSECURITY: IN THE PAST 12 MONTHS, HAS THE ELECTRIC, GAS, OIL, OR WATER COMPANY THREATENED TO SHUT OFF SERVICE IN YOUR HOME?: NO

## 2023-12-14 SDOH — HEALTH STABILITY: MENTAL HEALTH: HOW MANY STANDARD DRINKS CONTAINING ALCOHOL DO YOU HAVE ON A TYPICAL DAY?: PATIENT DOES NOT DRINK

## 2023-12-14 SDOH — HEALTH STABILITY: MENTAL HEALTH: HOW OFTEN DO YOU HAVE A DRINK CONTAINING ALCOHOL?: NEVER

## 2023-12-14 SDOH — ECONOMIC STABILITY: INCOME INSECURITY: IN THE LAST 12 MONTHS, WAS THERE A TIME WHEN YOU WERE NOT ABLE TO PAY THE MORTGAGE OR RENT ON TIME?: PATIENT REFUSED

## 2023-12-14 SDOH — ECONOMIC STABILITY: TRANSPORTATION INSECURITY
IN THE PAST 12 MONTHS, HAS THE LACK OF TRANSPORTATION KEPT YOU FROM MEDICAL APPOINTMENTS OR FROM GETTING MEDICATIONS?: PATIENT DECLINED

## 2023-12-14 SDOH — ECONOMIC STABILITY: HOUSING INSECURITY
IN THE LAST 12 MONTHS, WAS THERE A TIME WHEN YOU DID NOT HAVE A STEADY PLACE TO SLEEP OR SLEPT IN A SHELTER (INCLUDING NOW)?: NO

## 2023-12-14 SDOH — ECONOMIC STABILITY: INCOME INSECURITY: IN THE LAST 12 MONTHS, WAS THERE A TIME WHEN YOU WERE NOT ABLE TO PAY THE MORTGAGE OR RENT ON TIME?: NO

## 2023-12-14 SDOH — ECONOMIC STABILITY: TRANSPORTATION INSECURITY
IN THE PAST 12 MONTHS, HAS LACK OF TRANSPORTATION KEPT YOU FROM MEETINGS, WORK, OR FROM GETTING THINGS NEEDED FOR DAILY LIVING?: PATIENT DECLINED

## 2023-12-14 SDOH — ECONOMIC STABILITY: TRANSPORTATION INSECURITY
IN THE PAST 12 MONTHS, HAS LACK OF TRANSPORTATION KEPT YOU FROM MEETINGS, WORK, OR FROM GETTING THINGS NEEDED FOR DAILY LIVING?: NO

## 2023-12-14 SDOH — ECONOMIC STABILITY: HOUSING INSECURITY: IN THE LAST 12 MONTHS, HOW MANY PLACES HAVE YOU LIVED?: 1

## 2023-12-14 SDOH — ECONOMIC STABILITY: INCOME INSECURITY: HOW HARD IS IT FOR YOU TO PAY FOR THE VERY BASICS LIKE FOOD, HOUSING, MEDICAL CARE, AND HEATING?: NOT VERY HARD

## 2023-12-14 SDOH — SOCIAL STABILITY: SOCIAL INSECURITY: DO YOU FEEL ANYONE HAS EXPLOITED OR TAKEN ADVANTAGE OF YOU FINANCIALLY OR OF YOUR PERSONAL PROPERTY?: NO

## 2023-12-14 SDOH — SOCIAL STABILITY: SOCIAL INSECURITY: ARE THERE ANY APPARENT SIGNS OF INJURIES/BEHAVIORS THAT COULD BE RELATED TO ABUSE/NEGLECT?: NO

## 2023-12-14 SDOH — ECONOMIC STABILITY: INCOME INSECURITY: HOW HARD IS IT FOR YOU TO PAY FOR THE VERY BASICS LIKE FOOD, HOUSING, MEDICAL CARE, AND HEATING?: PATIENT DECLINED

## 2023-12-14 SDOH — SOCIAL STABILITY: SOCIAL INSECURITY: DO YOU FEEL UNSAFE GOING BACK TO THE PLACE WHERE YOU ARE LIVING?: NO

## 2023-12-14 SDOH — SOCIAL STABILITY: SOCIAL INSECURITY: DOES ANYONE TRY TO KEEP YOU FROM HAVING/CONTACTING OTHER FRIENDS OR DOING THINGS OUTSIDE YOUR HOME?: NO

## 2023-12-14 SDOH — SOCIAL STABILITY: SOCIAL INSECURITY: ARE YOU OR HAVE YOU BEEN THREATENED OR ABUSED PHYSICALLY, EMOTIONALLY, OR SEXUALLY BY ANYONE?: NO

## 2023-12-14 SDOH — ECONOMIC STABILITY: HOUSING INSECURITY
IN THE LAST 12 MONTHS, WAS THERE A TIME WHEN YOU DID NOT HAVE A STEADY PLACE TO SLEEP OR SLEPT IN A SHELTER (INCLUDING NOW)?: PATIENT REFUSED

## 2023-12-14 SDOH — ECONOMIC STABILITY: TRANSPORTATION INSECURITY
IN THE PAST 12 MONTHS, HAS THE LACK OF TRANSPORTATION KEPT YOU FROM MEDICAL APPOINTMENTS OR FROM GETTING MEDICATIONS?: NO

## 2023-12-14 SDOH — SOCIAL STABILITY: SOCIAL INSECURITY: HAS ANYONE EVER THREATENED TO HURT YOUR FAMILY OR YOUR PETS?: NO

## 2023-12-14 SDOH — SOCIAL STABILITY: SOCIAL INSECURITY: HAVE YOU HAD THOUGHTS OF HARMING ANYONE ELSE?: NO

## 2023-12-14 SDOH — SOCIAL STABILITY: SOCIAL INSECURITY: ABUSE: ADULT

## 2023-12-14 SDOH — SOCIAL STABILITY: SOCIAL INSECURITY: WERE YOU ABLE TO COMPLETE ALL THE BEHAVIORAL HEALTH SCREENINGS?: YES

## 2023-12-14 ASSESSMENT — ACTIVITIES OF DAILY LIVING (ADL)
GROOMING: UNABLE TO ASSESS
ASSISTIVE_DEVICE: OTHER (COMMENT)
BATHING: UNABLE TO ASSESS
JUDGMENT_ADEQUATE_SAFELY_COMPLETE_DAILY_ACTIVITIES: UNABLE TO ASSESS
DRESSING YOURSELF: UNABLE TO ASSESS
ADEQUATE_TO_COMPLETE_ADL: UNABLE TO ASSESS
PATIENT'S MEMORY ADEQUATE TO SAFELY COMPLETE DAILY ACTIVITIES?: UNABLE TO ASSESS
HEARING - LEFT EAR: UNABLE TO ASSESS
HEARING - RIGHT EAR: UNABLE TO ASSESS
LACK_OF_TRANSPORTATION: PATIENT DECLINED
TOILETING: UNABLE TO ASSESS
WALKS IN HOME: UNABLE TO ASSESS
FEEDING YOURSELF: UNABLE TO ASSESS

## 2023-12-14 ASSESSMENT — COGNITIVE AND FUNCTIONAL STATUS - GENERAL
WALKING IN HOSPITAL ROOM: TOTAL
TURNING FROM BACK TO SIDE WHILE IN FLAT BAD: A LOT
DAILY ACTIVITIY SCORE: 12
HELP NEEDED FOR BATHING: A LOT
MOBILITY SCORE: 12
MOVING FROM LYING ON BACK TO SITTING ON SIDE OF FLAT BED WITH BEDRAILS: A LOT
EATING MEALS: A LOT
DRESSING REGULAR UPPER BODY CLOTHING: A LOT
DRESSING REGULAR LOWER BODY CLOTHING: A LOT
DAILY ACTIVITIY SCORE: 12
MOVING FROM LYING ON BACK TO SITTING ON SIDE OF FLAT BED WITH BEDRAILS: A LOT
MOVING TO AND FROM BED TO CHAIR: TOTAL
TURNING FROM BACK TO SIDE WHILE IN FLAT BAD: A LOT
PATIENT BASELINE BEDBOUND: NO
MOBILITY SCORE: 6
PERSONAL GROOMING: A LOT
WALKING IN HOSPITAL ROOM: A LOT
DRESSING REGULAR LOWER BODY CLOTHING: A LOT
MOVING FROM LYING ON BACK TO SITTING ON SIDE OF FLAT BED WITH BEDRAILS: TOTAL
TURNING FROM BACK TO SIDE WHILE IN FLAT BAD: TOTAL
CLIMB 3 TO 5 STEPS WITH RAILING: A LOT
TOILETING: A LOT
CLIMB 3 TO 5 STEPS WITH RAILING: TOTAL
TOILETING: A LOT
EATING MEALS: A LOT
HELP NEEDED FOR BATHING: A LOT
CLIMB 3 TO 5 STEPS WITH RAILING: TOTAL
STANDING UP FROM CHAIR USING ARMS: A LOT
MOBILITY SCORE: 8
PERSONAL GROOMING: A LOT
MOVING TO AND FROM BED TO CHAIR: A LOT
DRESSING REGULAR UPPER BODY CLOTHING: A LOT
WALKING IN HOSPITAL ROOM: TOTAL
MOVING TO AND FROM BED TO CHAIR: TOTAL
STANDING UP FROM CHAIR USING ARMS: TOTAL
STANDING UP FROM CHAIR USING ARMS: TOTAL

## 2023-12-14 ASSESSMENT — LIFESTYLE VARIABLES
PRESCIPTION_ABUSE_PAST_12_MONTHS: NO
HOW OFTEN DO YOU HAVE 6 OR MORE DRINKS ON ONE OCCASION: NEVER
SKIP TO QUESTIONS 9-10: 1
AUDIT-C TOTAL SCORE: 0
SKIP TO QUESTIONS 9-10: 1
HOW OFTEN DO YOU HAVE A DRINK CONTAINING ALCOHOL: NEVER
SKIP TO QUESTIONS 9-10: 1
HOW MANY STANDARD DRINKS CONTAINING ALCOHOL DO YOU HAVE ON A TYPICAL DAY: PATIENT DOES NOT DRINK
SUBSTANCE_ABUSE_PAST_12_MONTHS: NO
AUDIT-C TOTAL SCORE: 0

## 2023-12-14 ASSESSMENT — PATIENT HEALTH QUESTIONNAIRE - PHQ9
1. LITTLE INTEREST OR PLEASURE IN DOING THINGS: NOT AT ALL
2. FEELING DOWN, DEPRESSED OR HOPELESS: NOT AT ALL
SUM OF ALL RESPONSES TO PHQ9 QUESTIONS 1 & 2: 0

## 2023-12-14 ASSESSMENT — COLUMBIA-SUICIDE SEVERITY RATING SCALE - C-SSRS
2. HAVE YOU ACTUALLY HAD ANY THOUGHTS OF KILLING YOURSELF?: NO
6. HAVE YOU EVER DONE ANYTHING, STARTED TO DO ANYTHING, OR PREPARED TO DO ANYTHING TO END YOUR LIFE?: NO
1. IN THE PAST MONTH, HAVE YOU WISHED YOU WERE DEAD OR WISHED YOU COULD GO TO SLEEP AND NOT WAKE UP?: NO

## 2023-12-14 ASSESSMENT — PAIN - FUNCTIONAL ASSESSMENT
PAIN_FUNCTIONAL_ASSESSMENT: 0-10

## 2023-12-14 ASSESSMENT — PAIN SCALES - GENERAL
PAINLEVEL_OUTOF10: 0 - NO PAIN

## 2023-12-14 ASSESSMENT — PAIN SCALES - WONG BAKER: WONGBAKER_NUMERICALRESPONSE: NO HURT

## 2023-12-14 NOTE — PROGRESS NOTES
Speech-Language Pathology  Adult Inpatient Clinical Bedside Swallow Evaluation    Patient Name: Nena Ojeda  MRN: [id number]  Today's Date: 12/14/2023   Start Time: 1045  Stop Time: 1110  Time Calculation (min): 25    History of Present Illness:   Nena Ojeda is a 83 y.o. female being admitted to the MICU for AHRF requiring AirVO.  PMHx notable for autonomic orthostatic hypotension, emphysema (chart dx of COPD w/o PFT). HFpEF?, acid reflux disease, rheumatoid osteoarthritis, hypertension, dyslipidemia, , urinary incontinence, hypothyroidism, moderate severity dementia, and previous aspiration PNA.   On arrival, patient ill appearing and not interactive. History obtained from daughter Annabelle. Patient resides at long term assisted living ([facility]) and presented to the ED this evening because she felt exceedingly tired at her meal and found to have low pulse oximetry reading. Daughter reports that she visits her mother once per week. Over the past 2 weeks patient with cough and feeling tired. No report of fever, SOB or chest pain. Not aware of any issues swallowing in the past. Also reports patient with worsening dementia over the past year (MOCA 28 to 18) and increasing need for assistance (I.e. walker to wheelchair).    Assessment:   Clinical bedside swallow evaluation completed. Per RN, pt with difficulty swallowing pills with water this AM. Pt received awake/alert and upright in bed. A&Ox4. Unremarkable oral mechanism examination. Pt initially denied any h/o dysphagia but endorsed a decrease in respiratory function during a meal prior to admission. At conclusion of session, pt reported undergoing MBSS in the past; SLP unable to locate documentation to verify. Of note, pt with baseline congested nonproductive cough. Strong cough prior to administration of PO trials.     Trials of ice chips, water via tsp/straw, and 3 oz water via straw were given. Normal oral management across all trials/consistencies. Audible  swallows and double swallows with ice chips/water via tsp; persisted with 3 oz water via straw. Pt unable to complete 3 oz water test via straw 2/2 ceasing trial with prolonged congested coughing and inspiration through oral cavity. Increased WOB following 3 oz water test and pt required >30 sec to recover. No further trials given 2/2 severity/risk for aspiration.     Recommend MBSS to instrumentally evaluate swallow and determine least restrictive diet. Recommend NPO with frequent aggressive oral care until MBSS can be completed; will complete as orders placed/radiology schedule permits. MD aware.      Recommendations:  NPO with frequent aggressive oral care.  5-7 ice chips/hr allowed for pleasure, safe swallow stimulation, and after oral care to prevent buildup of bacteria within the oropharynx  MBSS to instrumentally evaluate swallow and determine least restrictive diet; will complete as orders placed/radiology schedule permits    Goal:   Pt will tolerate least restrictive diet with no overt clinical s/s aspiration 100% of the time.        Plan:  SLP Services Indicated: Yes  Frequency: 2x week  Discussed POC with patient  SLP - OK to Discharge    Pain:   0-10  0 = No pain.     Inpatient Education:  Extensive education provided to patient regarding current swallow function, recommendations/results, and POC.      Consultations/Referrals/Coordination of Services:   N/A

## 2023-12-14 NOTE — PROGRESS NOTES
SOCIAL WORK NOTE  Patient off unit. Per notes, patient resides at [facility]. Social work to follow.  MILADYS Powell, LISW-S ([id number])

## 2023-12-14 NOTE — H&P
History Of Present Illness  Nena Ojeda is a 83 y.o. female being admitted to the MICU for AHRF requiring AirVO.  PMHx notable for autonomic orthostatic hypotension, emphysema (chart dx of COPD w/o PFT). HFpEF?, acid reflux disease, rheumatoid osteoarthritis, hypertension, dyslipidemia, , urinary incontinence, hypothyroidism, moderate severity dementia, and previous aspiration PNA.   On arrival, patient ill appearing and not interactive. History obtained from daughter Annabelle. Patient resides at long term assisted living ([facility]) and presented to the ED this evening because she felt exceedingly tired at her meal and found to have low pulse oximetry reading. Daughter reports that she visits her mother once per week. Over the past 2 weeks patient with cough and feeling tired. No report of fever, SOB or chest pain. Not aware of any issues swallowing in the past. Also reports patient with worsening dementia over the past year (MOCA 28 to 18) and increasing need for assistance (I.e. walker to wheelchair).    ED Course:   Vitals AF. /82, HR 97, SO2 97% (on supplemental oxygen)   Labs notable for WBC 18.4, Scr 1.15, , VBG 7.37/36   Imaging CXR suggestive of multi-focal PNA  Interventions: 500 ml LR, 20 IV lasix, Methypred 125, CTX, Azithro     ED concerned for COPD exacerbation and desiring MICU admission for HFNC requirement  On arrival, 30L/50%.     PMHx/PSHx - As above  Social Hx- as above  Allergies - Extensive      Medication History     Medication Name Instruction   Acetaminophen-Codeine #3 300-30 MG TABS take one to two  tablets up to 4 times per day as needed for pain   Albuterol Sulfate (2.5 MG/3ML) 0.083% Inhalation Nebulization Solution USE 1 UNIT DOSE EVERY 4-6 HOURS AS NEEDED FOR WHEEZING .   Ammonium Lactate 12 % External Lotion APPLY  AND RUB  IN A THIN FILM TO AFFECTED AREAS TWICE DAILY.(AM AND PM).   Atorvastatin Calcium 80 MG Oral Tablet TAKE 1 TABLET DAILY.   Azelastine HCl - 0.05 %  Ophthalmic Solution INSTILL 1 DROP IN EACH EYE TWICE DAILY AS NEEDED.   Calcium Magnesium Zinc 333-133-5 MG Oral Tablet     Celecoxib 200 MG Oral Capsule TAKE 1 CAPSULE Daily   Centrum Adults Oral Tablet     Diclofenac Sodium 1 % External Gel APPLY SPARINGLY TO AFFECTED AREA(S) ONCE DAILY   Donnatal 16.2 MG Oral Tablet One qd prn   Estradiol 0.5 MG Oral Tablet TAKE 1 TABLET DAILY.   Ezetimibe 10 MG Oral Tablet TAKE 1 TABLET DAILY.   Fluticasone Propionate 50 MCG/ACT Nasal Suspension USE 2 SPRAYS IN EACH NOSTRIL ONCE DAILY AS NEEDED   Fluticasone-Salmeterol 500-50 MCG/DOSE AEPB INHALE 1 PUFF TWICE DAILY.  rinse mouth after each use   Furosemide 20 MG Oral Tablet TAKE 1 TABLET BY MOUTH EVERY DAY   Hair Skin Nails Oral Capsule TAKE 1 CAPSULE Daily   Klor-Con 10 10 MEQ Oral Tablet Extended Release take two tablets daily and an additional dose with furosemide twice a week as directed   Levothyroxine Sodium 137 MCG Oral Tablet TAKE 1 TABLET DAILY.   Lidocaine HCl - 3 % External Cream apply to affected area 2-3 times per day as needed   Losartan Potassium 50 MG Oral Tablet TAKE 1 TABLET DAILY.   Lumigan 0.01 % Ophthalmic Solution INSTILL 1 DROP Bedtime   Magnesium Oxide 400 MG Oral Tablet TAKE 1 TABLET DAILY.   Mupirocin 2 % External Ointment APPLY A SMALL AMOUNT 3 TIMES DAILY AS DIRECTED.   Nebulizer System All-In-One use nebulizer every 4 hours as needed for asthma   Omeprazole 40 MG Oral Capsule Delayed Release TAKE ONE (1) CAPSULE BY MOUTH ONCE DAILY   ProAir  (90 Base) MCG/ACT AERS INHALE 1 TO 2 PUFFS EVERY 4 TO 6 HOURS AS NEEDED.   Prochlorperazine Maleate 5 MG Oral Tablet one tid prn nausea   Prolia 60 MG/ML Subcutaneous Solution Prefilled Syringe INJECT SUBCUTANEOUSLY 60MG/1ML EVERY 6 MONTHS   SB Docusate Sodium 100 MG Oral Capsule TAKE 1 CAPSULE Twice daily as needed for constipation   Sertraline HCl - 100 MG Oral Tablet take two tabs qhs   Spiriva HandiHaler 18 MCG Inhalation Capsule INHALE CONTENTS OF 1  CAPSULE ONCE DAILY.   Toviaz 8 MG Oral Tablet Extended Release 24 Hour TAKE 1 TABLET BY MOUTH ONCE A DAY        Past Medical History  Past Medical History:   Diagnosis Date    Acute kidney failure, unspecified (CMS/[facility]) 04/20/2019    WALESKA (acute kidney injury)    Adverse effect of other specified systemic anti-infectives and antiparasitics, initial encounter 06/29/2021    Plaquenil adverse reaction in therapeutic use    Aftercare following joint replacement surgery 06/04/2018    Aftercare following knee joint replacement surgery    Age-related nuclear cataract, left eye 06/04/2018    Age-related nuclear cataract of left eye    Age-related nuclear cataract, right eye 06/04/2018    Age-related nuclear cataract of right eye    Bilateral primary osteoarthritis of hip 06/04/2018    Primary osteoarthritis of both hips    Cellulitis of left lower limb 09/30/2021    Cellulitis of leg without foot, left    Cervical disc disorder, unspecified, unspecified cervical region 06/04/2018    Cervical disc disease    Flat foot (pes planus) (acquired), unspecified foot 06/04/2018    Acquired pes planovalgus    Insomnia, unspecified 06/04/2018    Insomnia    Nocturnal polyuria 10/28/2020    Nocturnal polyuria    Other amnesia 06/04/2018    Memory loss    Other conditions influencing health status 04/20/2019    Abdominal abscess    Other specified anxiety disorders 10/23/2019    Depression with anxiety    Other symptoms and signs involving the nervous system 06/28/2018    Transient neurological symptoms    Paresthesia of skin 10/11/2020    Paresthesia of left upper extremity    Personal history of diseases of the blood and blood-forming organs and certain disorders involving the immune mechanism 05/18/2017    History of anemia    Personal history of diseases of the skin and subcutaneous tissue 03/27/2022    History of cellulitis    Personal history of other diseases of the digestive system 10/23/2019    History of cholecystitis     Personal history of other diseases of the digestive system 10/23/2019    History of irritable bowel syndrome    Personal history of other diseases of the digestive system 07/07/2020    History of gastritis    Personal history of other diseases of the nervous system and sense organs 10/05/2020    History of neuropathy    Personal history of other diseases of the nervous system and sense organs 05/18/2017    History of sleep apnea    Personal history of other infectious and parasitic diseases 04/20/2019    History of sepsis    Personal history of other specified conditions 05/21/2019    History of bradycardia    Personal history of other specified conditions 10/14/2019    History of nocturia    Personal history of pneumonia (recurrent) 05/21/2019    History of community acquired pneumonia    Personal history of transient ischemic attack (TIA), and cerebral infarction without residual deficits 05/04/2018    History of transient ischemic attack    Posterior tibial tendinitis, unspecified leg 03/30/2018    Posterior tibial tendon dysfunction    Posterior tibial tendinitis, unspecified leg 06/04/2018    Posterior tibial tendon dysfunction    Primary open-angle glaucoma, right eye, moderate stage 01/31/2018    Primary open angle glaucoma of right eye, moderate stage    Primary osteoarthritis, unspecified hand 09/25/2019    Arthritis of hand    Radiculopathy, lumbar region 02/05/2019    Acute lumbar radiculopathy    Strain of muscle(s) and tendon(s) of the rotator cuff of right shoulder, initial encounter 01/19/2021    Strain of right rotator cuff capsule, initial encounter    Transient cerebral ischemic attack, unspecified 12/09/2020    TIA (transient ischemic attack)    Unilateral primary osteoarthritis, right hip 06/04/2018    Arthritis of right hip    Unspecified injury of head, sequela 02/26/2021    Head trauma, sequela    Unspecified rotator cuff tear or rupture of right shoulder, not specified as traumatic 05/18/2017     Rotator cuff tear arthropathy of right shoulder    Urgency of urination 06/04/2018    Urgency of urination    Urinary tract infection, site not specified 06/04/2018    UTI (urinary tract infection)    Urinary tract infection, site not specified 06/04/2018    Acute lower UTI       Surgical History  Past Surgical History:   Procedure Laterality Date    COLONOSCOPY  07/07/2015    Complete Colonoscopy    CT ABDOMEN PELVIS ANGIOGRAM W AND/OR WO IV CONTRAST  11/9/2018    CT ABDOMEN PELVIS ANGIOGRAM W AND/OR WO IV CONTRAST 11/9/2018 [facility]    HAND SURGERY  09/23/2014    Hand Surgery                                                                                                                                                          HYSTERECTOMY  09/25/2015    Hysterectomy    IR ANGIOGRAM AORTA ABDOMEN  4/10/2017    IR ANGIOGRAM AORTA ABDOMEN 4/10/2017 [facility]    IR INTERVENTION NEURO STENT  4/10/2017    IR INTERVENTION NEURO STENT 4/10/2017 [facility] AIB LEGAC[facility]    LUMBAR DISCECTOMY  09/14/2013    Spinal Diskectomy    OTHER SURGICAL HISTORY  09/14/2013    Salpingo-oophorectomy Left Side    OTHER SURGICAL HISTORY  06/06/2017    Transcath Intravasc Stent Placement Percutaneous Mesenteric    OTHER SURGICAL HISTORY  09/25/2015    Leg Repair    OTHER SURGICAL HISTORY  09/23/2014    Electronic Bladder Stimulator        Social History  She has no history on file for tobacco use, alcohol use, and drug use.    Family History  No family history on file.     Allergies  Penicillins, Ace inhibitors, Cephalexin, Cephalosporins, Donepezil, Gabapentin, Hydroxychloroquine sulfate, Mirabegron, Mirtazapine, Moxifloxacin, Sulfamethoxazole-trimethoprim, Ciprofloxacin, and Methotrexate    Review of Systems     Physical Exam    Constitutional: ill appearing   Eyes: EOMI, clear sclera  HENT: MMM  Head/neck: no appreciable JVD  Resp/thorax: diffuse inspiratory rhonchi   CV: +S1/S2, RRR, no m/r/g  GI: soft, NT,ND, +BS,  "no appreciable HSM  Ext: no edema/asymmetry  Neuro: responding to verbal command, not   Skin: warm and dry  Psych: mood/affect appropriate     Last Recorded Vitals  Blood pressure 128/84, pulse 102, temperature 37.7 °C (99.9 °F), temperature source Temporal, resp. rate 24, height 1.549 m (5' 1\"), weight 49.3 kg (108 lb 11 oz), SpO2 94 %.    Relevant Results         Assessment/Plan   Principal Problem:    Multifocal pneumonia    Nena Ojeda is a 83 y.o. female being admitted to the MICU for AHRF requiring AirVO (30L, 50%).     NEURO    #Acute Metabolic Encephalopathy: Suspect this to be in the setting of hypoxia, PNA.   #Dementia: Delirium precautions (lights off at 9pm and on at 8am, avoid  unnecessary/frequent vital checks, avoid early morning blood draws (choose mid-  morning), ensure daily BM)  #Depression: C/w sertraline  #Insomnia: c/w melatonin     CV  #HTN  #HFpEF  -Hold losartan and diuretic I/s/o of WALESKA. BNP elevated however dry on exam.   -Consider keeping patient net even daily to avoid additional culprit to hypoxemia     #DLD  -C/w statin     PULM  #AHRF  -C/w Airvo and wean as tolerated  -ABG (none obtained prior to arrival)   -Keep net even   -NPO for concern of aspiration   -Infectious management below     #Emphysema   -Present on previous CT   -Chart diagnosis of COPD however no PFT  -VBG negative for hypercapia  -C/w home inhaler     ID  #Multi-focal PNA  #Hx of Aspiration PNA  -C/w CTX and azithromycin low concern for MRSA)  -NPO  -SLP consult in the AM  -Follow up urine strep/legionella and procal    GI  #GERD: C/w PPI    ENDO  #Hypothyroidism - c/w home synthroid     F 500 ml in the ED  E prn   N NPO  D Lovenox  A PIV  DNAR/DNI (confirmed with daniele/CHRISTAL Alanis)       Vargas Tavares MD    "

## 2023-12-14 NOTE — CARE PLAN
The patient's goals for the shift include      The clinical goals for the shift include pt will not need increase in O2 requirements

## 2023-12-14 NOTE — PROGRESS NOTES
Physical Therapy    Physical Therapy Evaluation    Patient Name: Nena Ojeda  MRN: [id number]  Today's Date: 12/14/2023   Time Calculation  Start Time: 1100  Stop Time: 1129  Time Calculation (min): 29 min    Assessment/Plan   PT Assessment  PT Assessment Results: Decreased strength, Impaired balance, Decreased mobility, Decreased cognition  Rehab Prognosis: Good  End of Session Communication: Bedside nurse  End of Session Patient Position: Bed, 3 rail up, Alarm on  IP OR SWING BED PT PLAN  Inpatient or Swing Bed: Inpatient  PT Plan  Treatment/Interventions: Bed mobility, Transfer training, Gait training, Balance training, Strengthening, Therapeutic activity, Therapeutic exercise, Postural re-education  PT Plan: Skilled PT  PT Frequency: 3 times per week  PT Discharge Recommendations: Moderate intensity level of continued care  PT - OK to Discharge: Yes      Subjective   General Visit Information:  General  Reason for Referral: acute hypoxic respiratory failure requiring AirVo, now on nasal cannula  Past Medical History Relevant to Rehab: autonomic orthostatic hypotension, emphysema, HFpEF? acid reflux disease, rheumatoid osteoarthritis, hypertension, dyslipidemia, , urinary incontinence, hypothyroidism, moderate severity dementia, and previous aspiration PNA.  Family/Caregiver Present: No  Prior to Session Communication: Bedside nurse  Patient Position Received: Bed, 3 rail up, Alarm off, not on at start of session  General Comment: pleasant and agreeable to participate in therapy. on 5L NC; patient with frequent coughing throughout session, PT assisted patient in using yankar, patient demo'd difficulty using yankar independently to effective remove oral secretions. RN made aware.  Home Living:  Home Living  Type of Home:  (per EMR-> longterm [facility] at [place])  Home Adaptive Equipment:  (2ww and WC per patient report)  Home Layout: One level  Home Access: No concerns  Prior Level of Function:  Prior Function Per  "Pt/Caregiver Report  Level of Powell:  (questionable historian, reports going to dining craig for meals; staff assists with ADLs, anticipate staff completes iADLs; reports using WC mostly, unclear last time patient ambulated (with 2ww?), anticipate staff A with transfers to )  Precautions:  Precautions  Hearing/Visual Limitations: reports wearing reading glasses  Medical Precautions: Fall precautions, Oxygen therapy device and L/min  Vital Signs:  Vital Signs  Heart Rate:  (pre: 93 post: 90)  Resp:  (pre: 20 post: 22)  SpO2:  (pre: 93% post: 93%)  BP:  (pre: 122/77 post: 128/82)    Objective   Pain:  Pain Assessment  Pain Assessment: 0-10  Pain Score: 0 - No pain  Cognition:  Cognition  Overall Cognitive Status: Impaired, Impaired at baseline  Orientation Level:  (able to state name, with choices able to state hopsital, with choices stated November as month, stated \"December 19\" for year, with choices, able to state 2023; re-orientation provided.)    General Assessments:       Sensation  Light Touch:  (did not report numbness/tingling in BUE/BLEs.)    Postural Control  Postural Control: Impaired  Trunk Control: mild retrolean in sitting EOB  Posture Comment: increase FF posture    Static Sitting Balance  Static Sitting-Balance Support: Bilateral upper extremity supported  Static Sitting-Level of Assistance: Minimum assistance  Static Sitting-Comment/Number of Minutes: x1 assist, favors increase FF posture, BLE favor L sway positioning seated EOB  Dynamic Sitting Balance  Dynamic Sitting-Balance Support: Bilateral upper extremity supported  Dynamic Sitting-Balance:  (retrolean with FF posture)  Dynamic Sitting-Comments: MIN-MODx1  Functional Assessments:  Bed Mobility  Bed Mobility: Yes  Bed Mobility 1  Bed Mobility 1: Supine to sitting, Sitting to supine  Level of Assistance 1: Maximum assistance, Moderate verbal cues, Minimal tactile cues  Bed Mobility Comments 1: HOB elevated, x1 assist + draw " sheet  Extremity/Trunk Assessments:  RUE   RUE :  (limited AROM @ shoulder d/t arthritic changes, PROM shoulder flexion ~80 degrees (audible crepitus), distal AROM WFL; shoulder flexion <3-/5, remainder 4-/5)  LUE   LUE:  (AROM grossly WFL, shoulder flexion >/=3/5, remainder 4-/5)  RLE   RLE :  (limited AROM, observed knee flexion contracture, PROM DF lacking from neutral, PF/DF 3/5 knee ext <3-/5)  LLE   LLE :  (limited AROM, observed knee flexion contracture, PROM DF lacking from neutral, PF/DF 3/5 knee ext <3-/5)  Outcome Measures:  [facility] Basic Mobility  Turning from your back to your side while in a flat bed without using bedrails: A lot  Moving from lying on your back to sitting on the side of a flat bed without using bedrails: A lot  Moving to and from bed to chair (including a wheelchair): Total  Standing up from a chair using your arms (e.g. wheelchair or bedside chair): Total  To walk in hospital room: Total  Climbing 3-5 steps with railing: Total  Basic Mobility - Total Score: 8    FSS-ICU  Ambulation: Unable to attempt due to weakness  Rolling: Maximal assistance (performs 25% - 49% of task)  Sitting: Moderate assistance (performs 50 - 74% of task)  Transfer Sit-to-Stand: Unable to perform  Transfer Supine-to-Sit: Maximal assistance (performs 25% - 49% of task)  Total Score: 7    E = Exercise and Early Mobility  Current Activity: Sitting at edge of bed    Encounter Problems       Encounter Problems (Active)       PT Problem       Patient will complete bed mobility with MODx1 using bedrailing as needed without acute LOB        Start:  12/14/23    Expected End:  01/04/24            Patient will complete STS with MODx1 using LRAD without acute LOB         Start:  12/14/23    Expected End:  01/04/24            Patient will complete bed<->chair transfer with moderate assist x1 using LRD as needed without acute LOB        Start:  12/14/23    Expected End:  01/04/24            Patient will participate in BLE  there-ex program in order to assist in improving strength and to assist with the completion of functional mobility tasks.        Start:  12/14/23    Expected End:  01/04/24            Patient will ambulate >/=5' with LRAD with MODx1 without acute LOB        Start:  12/14/23    Expected End:  01/04/24                   Education Documentation  Mobility Training, taught by Corrie Hein PT at 12/14/2023  3:36 PM.  Learner: Patient  Readiness: Acceptance  Method: Explanation  Response: Needs Reinforcement    Education Comments  No comments found.        Corrie Hein PT, DPT

## 2023-12-14 NOTE — CARE PLAN
Floor Readiness Note       I, personally, evaluated Nena Ojeda prior to transfer to the floor, including reviewing all current laboratory and imaging studies. The patient remains appropriate for transfer to the floor. Bedside nurse and respiratory therapy are also in agreement of patient's readiness for the floor.     Brief summary:  Nena Ojeda is a 83 y.o. female who was admitted to the MICU on 12/14 for acute hypoxic respiratory failure. They have been treated with Ceftriaxone, Azithromycin, Prednisone.     Updated focused Physical Exam:  Physical Exam  HENT:      Head: Normocephalic and atraumatic.      Mouth/Throat:      Mouth: Mucous membranes are dry.   Eyes:      Extraocular Movements: Extraocular movements intact.      Conjunctiva/sclera: Conjunctivae normal.      Pupils: Pupils are equal, round, and reactive to light.   Cardiovascular:      Rate and Rhythm: Normal rate. Rhythm irregular.   Pulmonary:      Breath sounds: Rhonchi present.      Comments: Course breath sounds  Abdominal:      General: Abdomen is flat. Bowel sounds are normal.      Palpations: Abdomen is soft.      Tenderness: There is no abdominal tenderness.   Skin:     General: Skin is warm and dry. No LE edema.  Neurological:      Mental Status: She is alert.      Comments: Aox1-2 (name/date), follows commands   Psychiatric:         Mood and Affect: Mood normal.        Current Vital Signs:  Heart Rate: 88 (12/14/23 0900 : Ritu MARY RN)  BP: 123/65 (12/14/23 0900 : Ritu MARY RN)  Temp: 37 °C (98.6 °F) (12/14/23 1150 : Ketty M Reed)  Resp: 17 (12/14/23 0900 : Ritu MARY RN)  SpO2: 94 % (12/14/23 0900 : Ritu MARY RN)    Relevant updates since rounds:  Follow-Up:  []Speech Recs, MBBS results  []Urine strep/legionella, procal, resp panel  [ ] restart home medications as tolerated    Accepting team, Hospitalist LEIGHOTN, received verbal sign out and the Provider Care team/Attending has been updated. Bedside nurse will  now call accepting nurse for report and patient will be transferred to [place] [id number].    Christelle Ramirez MD

## 2023-12-14 NOTE — CARE PLAN
ICU to [place] Transfer Summary     I:  ICU Admission Reason & Brief ICU Course:    Nena Ojeda is a 83 y.o. female being admitted to the MICU for AHRF requiring AirVO.PMHx notable for autonomic orthostatic hypotension, emphysema (chart dx of COPD w/o PFT). HFpEF, acid reflux disease, rheumatoid osteoarthritis, hypertension, dyslipidemia, , urinary incontinence, hypothyroidism, moderate severity dementia, and previous aspiration PNA.   On arrival, patient ill appearing and not interactive. History obtained from daughter Annabelle. Patient resides at [facility] term assisted [facility]) and presented to the ED this evening because she felt exceedingly tired at her meal and found to have low pulse oximetry reading. Daughter reports that she visits her mother once per week. Over the past 2 weeks patient with cough and feeling tired. No report of fever, SOB or chest pain. Not aware of any issues swallowing in the past. Also reports patient with worsening dementia over the past year (MOCA 28 to 18) and increasing need for assistance (I.e. walker to wheelchair).  In the MICU, patient was transitioned from AirVO to 4LNC, treated as an aspiration pneumonia with ceftriaxone/azithro and COPD exacerbation. Mental status back to baseline.         C: Code Status/DPOA Info/Goals of Care/ACP Note    DNR and No Intubation  DPOA/Contact Number: daniele/CHRISTAL Alanis [phone]     U: Unprescribing & Pertinent High-Risk Medications    Changes to home meds: holding home losartan, diuretic in setting of WALESKA     Anticoagulation: Yes - SQH    Antibiotics:   [] N/A - no current planned antimicrobioals  []  Ceftriaxone indication aspiration pneumonia/CAP start date 12/13 planned duration 5 days. Azithromycin indication aspiration pneumonia/CAP start date 12/14 planned duration 5 days.    P: Pending Tests at the Time of Transfer   Swallow Evaluation- pending MBBS      A: Active consultants, including Rehab:   []  Subspecialty Consultants:   none  [x]  PT  [x]  OT  [x]  SLP  []  Wound Care    U: Uncertainty Measure/Diagnostic Pause:    Working diagnosis at the time of transfer AHRF 2/2 aspiration pneumonia vs CAP vs aspiration pneumonitis     Diagnosis Degree of Certainty: 1. High degree of certainty about the clinical diagnosis.     S: Summary of Major Problems and To-Dos:   #Acute Metabolic Encephalopathy: Suspect this to be in the setting of hypoxia, PNA. -> resolving  #Dementia: Delirium precautions (lights off at 9pm and on at 8am, avoid  unnecessary/frequent vital checks, avoid early morning blood draws (choose mid-  morning), ensure daily BM)    #AHRF most likely 2/2 to aspiration pneumonia vs CAP vs pneumonitis vs COPD exacerbation  #Multi-focal PNA  #Hx of Aspiration PNA  #COPD  ::weaned off ARVO this AM to 4L NC  ::VBG pH 7.37/36/1.9  -NPO for concern of aspiration   -cont w/ 5 days of ceftriaxone, 3 days azithro, 5 days of pred  -speech consulted, MBBS  -Follow up urine strep/legionella and procal, MRSA, resp panel     Follow-Up:  []Speech Recs, MBBS  []Urine strep/legionella, procal, resp panel     E: Exam, including Lines/Drains/Airways & Data Review:     Difficult airway? N/A  Lines/drains assessed for removal? No    Within 30 minutes of the patient physically leaving the floor, a Floor Readiness Note needs to be placed with updated vitals.

## 2023-12-14 NOTE — PROGRESS NOTES
Critical Care Daily Progress        Subjective   Nena Ojeda is a 83 y.o. female on day 1 of admission presenting with Multifocal pneumonia.     Interval History: Admitted this AM. Patient denies any CP/SOB, N/V, F/C, last BM this AM soft solid and without blood. Does not remember being admitted to the hospital.     Complaints: has none..     Scheduled Medications:   atorvastatin, 80 mg, oral, Nightly  cefTRIAXone, 2 g, intravenous, q24h  docusate sodium, 100 mg, oral, BID  enoxaparin, 30 mg, subcutaneous, q24h  ezetimibe, 10 mg, oral, Nightly  latanoprost, 1 drop, Both Eyes, Nightly  levothyroxine, 125 mcg, oral, Daily  melatonin, 3 mg, oral, Nightly  pantoprazole, 40 mg, oral, Daily before breakfast  sertraline, 50 mg, oral, Daily  tiotropium, 2 Inhalation, inhalation, Daily         Continuous Medications:         PRN Medications:   PRN medications: ondansetron, oxygen    Objective   Vitals:  Most Recent:  Vitals:    12/14/23 0800   BP:    Pulse:    Resp:    Temp: 37.1 °C (98.8 °F)   SpO2:        24hr Min/Max:  Temp  Min: 36.6 °C (97.9 °F)  Max: 37.7 °C (99.9 °F)  Pulse  Min: 95  Max: 109  BP  Min: 120/62  Max: 144/60  Resp  Min: 13  Max: 78  SpO2  Min: 92 %  Max: 97 %    LDA:         Vent settings:  FiO2 (%):  [40 %-60 %] 50 %  S RR:  [16] 16    Hemodynamic parameters for last 24 hours:       I/O:    Intake/Output Summary (Last 24 hours) at 12/14/2023 0824  Last data filed at 12/13/2023 1625  Gross per 24 hour   Intake 50 ml   Output --   Net 50 ml       Physical Exam:   Physical Exam  HENT:      Head: Normocephalic and atraumatic.      Mouth/Throat:      Mouth: Mucous membranes are dry.   Eyes:      Extraocular Movements: Extraocular movements intact.      Conjunctiva/sclera: Conjunctivae normal.      Pupils: Pupils are equal, round, and reactive to light.   Cardiovascular:      Rate and Rhythm: Normal rate. Rhythm irregular.   Pulmonary:      Breath sounds: Rhonchi present.      Comments: Course breath  sounds  Abdominal:      General: Abdomen is flat. Bowel sounds are normal.      Palpations: Abdomen is soft.      Tenderness: There is no abdominal tenderness.   Skin:     General: Skin is warm and dry.   Neurological:      Mental Status: She is alert.      Comments: Aox1, follows commands   Psychiatric:         Mood and Affect: Mood normal.          Lab/Radiology/Diagnostic Review:  Results for orders placed or performed during the hospital encounter of 12/13/23 (from the past 24 hour(s))   SST TOP   Result Value Ref Range    Extra Tube Hold for add-ons.    Blood Gas Venous Full Panel Unsolicited   Result Value Ref Range    POCT pH, Venous 7.37 7.33 - 7.43 pH    POCT pCO2, Venous 36 (L) 41 - 51 mm Hg    POCT pO2, Venous 32 (L) 35 - 45 mm Hg    POCT SO2, Venous 43 (L) 45 - 75 %    POCT Oxy Hemoglobin, Venous 42.4 (L) 45.0 - 75.0 %    POCT Hematocrit Calculated, Venous 35.0 (L) 36.0 - 46.0 %    POCT Sodium, Venous 132 (L) 136 - 145 mmol/L    POCT Potassium, Venous 5.2 3.5 - 5.3 mmol/L    POCT Chloride, Venous 102 98 - 107 mmol/L    POCT Ionized Calicum, Venous 1.16 1.10 - 1.33 mmol/L    POCT Glucose, Venous 102 (H) 74 - 99 mg/dL    POCT Lactate, Venous 1.9 0.4 - 2.0 mmol/L    POCT Base Excess, Venous -4.0 (L) -2.0 - 3.0 mmol/L    POCT HCO3 Calculated, Venous 20.8 (L) 22.0 - 26.0 mmol/L    POCT Hemoglobin, Venous 11.8 (L) 12.0 - 16.0 g/dL    POCT Anion Gap, Venous 14.0 10.0 - 25.0 mmol/L    Patient Temperature 37.0 degrees Celsius   CBC and Auto Differential   Result Value Ref Range    WBC 18.4 (H) 4.4 - 11.3 x10*3/uL    nRBC 0.0 0.0 - 0.0 /100 WBCs    RBC 4.23 4.00 - 5.20 x10*6/uL    Hemoglobin 12.1 12.0 - 16.0 g/dL    Hematocrit 34.3 (L) 36.0 - 46.0 %    MCV 81 80 - 100 fL    MCH 28.6 26.0 - 34.0 pg    MCHC 35.3 32.0 - 36.0 g/dL    RDW 15.6 (H) 11.5 - 14.5 %    Platelets 395 150 - 450 x10*3/uL    Immature Granulocytes %, Automated 1.8 (H) 0.0 - 0.9 %    Immature Granulocytes Absolute, Automated 0.33 0.00 - 0.50  x10*3/uL   Phosphorus   Result Value Ref Range    Phosphorus 4.1 2.5 - 4.9 mg/dL   Magnesium   Result Value Ref Range    Magnesium 1.75 1.60 - 2.40 mg/dL   Comprehensive metabolic panel   Result Value Ref Range    Glucose 88 74 - 99 mg/dL    Sodium 135 (L) 136 - 145 mmol/L    Potassium 5.0 3.5 - 5.3 mmol/L    Chloride 103 98 - 107 mmol/L    Bicarbonate 20 (L) 21 - 32 mmol/L    Anion Gap 17 10 - 20 mmol/L    Urea Nitrogen 39 (H) 6 - 23 mg/dL    Creatinine 1.15 (H) 0.50 - 1.05 mg/dL    eGFR 47 (L) >60 mL/min/1.73m*2    Calcium 8.8 8.6 - 10.6 mg/dL    Albumin 2.9 (L) 3.4 - 5.0 g/dL    Alkaline Phosphatase 78 33 - 136 U/L    Total Protein 6.4 6.4 - 8.2 g/dL    AST 24 9 - 39 U/L    Bilirubin, Total 0.8 0.0 - 1.2 mg/dL    ALT 15 7 - 45 U/L   Protime-INR   Result Value Ref Range    Protime 25.1 (H) 9.8 - 12.8 seconds    INR 2.2 (H) 0.9 - 1.1   Troponin I, High Sensitivity   Result Value Ref Range    Troponin I, High Sensitivity 8 0 - 34 ng/L   B-Type Natriuretic Peptide   Result Value Ref Range     (H) 0 - 99 pg/mL   Manual Differential   Result Value Ref Range    Neutrophils %, Manual 74.8 40.0 - 80.0 %    Bands %, Manual 22.8 0.0 - 5.0 %    Lymphocytes %, Manual 2.4 13.0 - 44.0 %    Monocytes %, Manual 0.0 2.0 - 10.0 %    Eosinophils %, Manual 0.0 0.0 - 6.0 %    Basophils %, Manual 0.0 0.0 - 2.0 %    Seg Neutrophils Absolute, Manual 13.76 (H) 1.60 - 5.00 x10*3/uL    Bands Absolute, Manual 4.20 (H) 0.00 - 0.50 x10*3/uL    Lymphocytes Absolute, Manual 0.44 (L) 0.80 - 3.00 x10*3/uL    Monocytes Absolute, Manual 0.00 (L) 0.05 - 0.80 x10*3/uL    Eosinophils Absolute, Manual 0.00 0.00 - 0.40 x10*3/uL    Basophils Absolute, Manual 0.00 0.00 - 0.10 x10*3/uL    Total Cells Counted 123     Neutrophils Absolute, Manual 17.96 (H) 1.60 - 5.50 x10*3/uL    RBC Morphology See Below     [place] Cells Few    Sars-CoV-2 PCR, Symptomatic   Result Value Ref Range    Coronavirus 2019, PCR Not Detected Not Detected   Influenza A, and B PCR    Result Value Ref Range    Flu A Result Not Detected Not Detected    Flu B Result Not Detected Not Detected   ECG 12 Lead   Result Value Ref Range    Ventricular Rate 97 BPM    Atrial Rate 97 BPM    [place] Interval 130 ms    QRS Duration 66 ms    QT Interval 324 ms    QTC Calculation(Bazett) 411 ms    P Axis 66 degrees    R Axis 0 degrees    T Axis 71 degrees    QRS Count 16 beats    Q Onset 225 ms    P Onset 160 ms    P Offset 209 ms    T Offset 387 ms    QTC Fredericia 380 ms   CBC   Result Value Ref Range    WBC 22.8 (H) 4.4 - 11.3 x10*3/uL    nRBC 0.0 0.0 - 0.0 /100 WBCs    RBC 3.93 (L) 4.00 - 5.20 x10*6/uL    Hemoglobin 11.1 (L) 12.0 - 16.0 g/dL    Hematocrit 32.1 (L) 36.0 - 46.0 %    MCV 82 80 - 100 fL    MCH 28.2 26.0 - 34.0 pg    MCHC 34.6 32.0 - 36.0 g/dL    RDW 15.9 (H) 11.5 - 14.5 %    Platelets 402 150 - 450 x10*3/uL   Comprehensive metabolic panel   Result Value Ref Range    Glucose 97 74 - 99 mg/dL    Sodium 138 136 - 145 mmol/L    Potassium 4.3 3.5 - 5.3 mmol/L    Chloride 101 98 - 107 mmol/L    Bicarbonate 22 21 - 32 mmol/L    Anion Gap 19 10 - 20 mmol/L    Urea Nitrogen 40 (H) 6 - 23 mg/dL    Creatinine 1.08 (H) 0.50 - 1.05 mg/dL    eGFR 51 (L) >60 mL/min/1.73m*2    Calcium 8.0 (L) 8.6 - 10.6 mg/dL    Albumin 2.6 (L) 3.4 - 5.0 g/dL    Alkaline Phosphatase 66 33 - 136 U/L    Total Protein 5.5 (L) 6.4 - 8.2 g/dL    AST 51 (H) 9 - 39 U/L    Bilirubin, Total 0.6 0.0 - 1.2 mg/dL    ALT 23 7 - 45 U/L   Magnesium   Result Value Ref Range    Magnesium 1.81 1.60 - 2.40 mg/dL   Phosphorus   Result Value Ref Range    Phosphorus 4.7 2.5 - 4.9 mg/dL                            Assessment/Plan   Principal Problem:    Multifocal pneumonia    Nena Monk is a 83 y.o. female PMHx notable for autonomic orthostatic hypotension, emphysema (chart dx of COPD w/o PFT). HFpEF, GERD, rheumatoid osteoarthritis, hypertension, dyslipidemia, urinary incontinence, hypothyroidism, moderate severity dementia, and previous  aspiration PNA currently being admitted to the MICU for AHRF requiring AirVO (30L, 50%).      NEURO  #Acute Metabolic Encephalopathy: Suspect this to be in the setting of hypoxia, PNA. -> resolving  #Dementia: Delirium precautions (lights off at 9pm and on at 8am, avoid  unnecessary/frequent vital checks, avoid early morning blood draws (choose mid-  morning), ensure daily BM)  #Depression: C/w sertraline  #Insomnia: c/w melatonin      CV  #HTN  #HFpEF  ::echo 07/2022: EF 65%, LA dilation, moderate aortic stenosis  -Hold losartan and diuretic I/s/o of WALESKA. BNP elevated however dry on exam.   -Consider keeping patient net even daily to avoid additional culprit to hypoxemia      #DLD  -C/w statin      PULM  #AHRF most likely 2/2 to aspiration pneumonitis  ::weaned off ARVO this AM to 4L NC  ::VBG pH 7.37/36/1.9  -NPO for concern of aspiration, speech consulted and recommended MBBS + NPO with frequent aggressive oral care   -Infectious management below      #Emphysema   ::Present on previous CT, chart diagnosis of COPD however no PFT  ::VBG negative for hypercapia  -C/w home inhaler, if does not improve can consider starting duonebs      ID  #Multi-focal PNA  #Hx of Aspiration PNA  -C/w CTX and azithromycin   -NPO  -SLP consult  -Follow up urine strep/legionella and procal, MRSA, resp panel     GI  #GERD: C/w PPI     ENDO  #Hypothyroidism - c/w home synthroid      F 500 ml in the ED  E prn   N NPO  D Heparin  A PIV  DNAR/DNI (confirmed with daniele/CHRISTAL Alanis)     Christelle Ramirez MD   Internal Medicine PGY-1

## 2023-12-14 NOTE — PROGRESS NOTES
Pharmacy Medication History Review    Nena Ojeda is a 83 y.o. female admitted for Multifocal pneumonia. Pharmacy reviewed the patient's xsiuz-eq-mrbojzjso medications and allergies for accuracy.      PTA Medication List has been updated as appears on Order Summary Report from [facility] (12/13/23, 14:25:08 ET).       The list below reflects the updated PTA list.     Prior to Admission Medications   Prescriptions   Facility Reported?    DULoxetine (Cymbalta) 20 mg DR capsule   Yes    Sig: Take 1 capsule (20 mg) by mouth once daily.   acetaminophen (Tylenol) 325 mg tablet   Yes    Sig: Take 2 tablets (650 mg) by mouth every 4 hours if needed.   acetaminophen-codeine (Tylenol w/ Codeine #3) 300-30 mg tablet   Yes    Sig: Take 1 tablet by mouth every 12 hours if needed (for PAIN 1 to 5).   acetaminophen-codeine (Tylenol w/ Codeine #3) 300-30 mg tablet   Yes    Sig: Take 2 tablets by mouth every 12 hours if needed (for PAIN 6 to 10).   albuterol-budesonide (Airsupra) 90-80 mcg/actuation inhaler   Yes    Sig: Inhale 2 puffs every 4 hours if needed (shortness of breath).   amLODIPine (Norvasc) 2.5 mg tablet   Yes    Sig: Take 1 tablet (2.5 mg) by mouth once daily.   atorvastatin (Lipitor) 40 mg tablet   Yes    Sig: Take 1 tablet (40 mg) by mouth once daily at bedtime.   bimatoprost (Lumigan) 0.01 % ophthalmic solution   Yes    Sig: Administer 1 drop into both eyes once daily at bedtime.   buPROPion XL (Wellbutrin XL) 150 mg 24 hr tablet   Yes    Sig: Take 1 tablet (150 mg) by mouth every 12 hours.    celecoxib (CeleBREX) 200 mg capsule   Yes    Sig: Take 1 capsule (200 mg) by mouth once daily in the morning.   dextromethorphan-guaifenesin  mg/10 mL liquid in packet   Yes    Sig: Take 15 mL by mouth 3 times a day. For 7 days ending 12/19/23   docusate sodium (Colace) 100 mg capsule   Yes    Sig: Take 1 capsule (100 mg) by mouth 2 times a day.   estradiol (Estrace) 0.5 mg tablet   Yes    Sig:  Take 1 tablet (0.5 mg) by mouth once daily in the morning.   ezetimibe (Zetia) 10 mg tablet   Yes    Sig: Take 1 tablet (10 mg) by mouth once daily at bedtime.   fesoterodine (Toviaz) 8 mg tablet extended release 24 hr   Yes    Sig: Take 1 tablet (8 mg) by mouth once daily at bedtime.   fluticasone propion-salmeteroL (Advair Diskus) 500-50 mcg/dose diskus inhaler   Yes    Sig: Inhale 1 puff 2 times a day.   furosemide (Lasix) 20 mg tablet   Yes    Sig: Take 1 tablet (20 mg) by mouth once daily.   levothyroxine (Synthroid, Levoxyl) 125 mcg tablet   Yes    Sig: Take 1 tablet (125 mcg) by mouth once daily.   lidocaine 3 % cream   Yes    Sig: Apply 1 Application topically every 8 hours if needed (to legs topically).   magnesium oxide (Mag-Ox) 400 mg tablet   Yes    Sig: Take 1 tablet (400 mg) by mouth once daily.   melatonin 3 mg tablet   Yes    Sig: Take 1 tablet (3 mg) by mouth once daily at bedtime.   omeprazole (PriLOSEC) 40 mg DR capsule   Yes    Sig: Take 1 capsule (40 mg) by mouth 2 times a day.   polyethylene glycol (Glycolax, Miralax) 17 gram packet   Yes    Sig: Take 17 g by mouth once daily.   potassium chloride CR 10 mEq ER tablet   Yes    Sig: Take 2 tablets (20 mEq) by mouth once daily.   sertraline (Zoloft) 50 mg tablet   Yes    Sig: Take 1 tablet (50 mg) by mouth once daily.   tiotropium (Spiriva) 18 mcg inhalation capsule   Yes    Sig: Place 1 capsule (18 mcg) into inhaler and inhale once daily.      Facility-Administered Medications: None        The list below reflects the updated allergy list. Please review each documented allergy for additional clarification and justification.  Allergies  Reviewed by Kassy Abraham MD on 12/13/2023        Severity Reactions Comments    Penicillins High Hallucinations, Nausea Only, Confusion, Palpitations, Unknown heart palpitations psychosis    Ace Inhibitors Not Specified Unknown     Cephalexin Not Specified Nausea Only, Nausea/vomiting     Cephalosporins Not  Specified Nausea Only, Nausea/vomiting     Donepezil Not Specified Swelling     Gabapentin Not Specified Hallucinations     Hydroxychloroquine Sulfate Not Specified Hives Vision declined    Mirabegron Not Specified Hallucinations, Confusion, Psychosis psychosis    Mirtazapine Not Specified Confusion, Psychosis psychosis    Moxifloxacin Not Specified Nausea Only, Nausea/vomiting     Sulfamethoxazole-trimethoprim Not Specified Nausea Only, Nausea/vomiting     Ciprofloxacin Low Cough, Itching, Rash     Methotrexate Low Rash           ------------------------------------  Joe Wang, PharmD, [facility]  Transitions of Care Pharmacist  Medication reconciliation complete  Please reach out via [employer] Secure Chat for questions,   or if no response call [employer] or [employer].  [facility]

## 2023-12-14 NOTE — PROGRESS NOTES
Speech-Language Pathology  Adult Inpatient Modified Barium Swallow Study (MBSS)    Patient Name: Nena Ojeda  MRN: [id number]  Today's Date: 12/14/2023   Start Time: 1300  Stop Time: 1345  Time Calculation (min): 45    Initial MBSS: Yes    Respiratory Status: nasal cannula, NAD    History of Present Illness:   Nena Ojeda is a 83 y.o. female being admitted to the MICU for AHRF requiring AirVO.  PMHx notable for autonomic orthostatic hypotension, emphysema (chart dx of COPD w/o PFT). HFpEF?, acid reflux disease, rheumatoid osteoarthritis, hypertension, dyslipidemia, , urinary incontinence, hypothyroidism, moderate severity dementia, and previous aspiration PNA.   On arrival, patient ill appearing and not interactive. History obtained from daughter Annabelle. Patient resides at [facility] term assisted living ([facility]) and presented to the ED this evening because she felt exceedingly tired at her meal and found to have low pulse oximetry reading. Daughter reports that she visits her mother once per week. Over the past 2 weeks patient with cough and feeling tired. No report of fever, SOB or chest pain. Not aware of any issues swallowing in the past. Also reports patient with worsening dementia over the past year (MOCA 28 to 18) and increasing need for assistance (I.e. walker to wheelchair).    Impression:   Modified Barium Swallow Study completed. Verbal consent obtained. Pt alert, cooperative, and able to follow commands throughout study.     Pt with severe oropharyngeal dysphagia. Trials of thin liquids, nectar (mildly) thick liquids, honey (moderately) thick liquids, purees, and regular solids were given. Pt with adequate oral acceptance of all consistencies/trials. Impaired oral management of purees and regular solids e/b significantly prolonged bolus manipulation/mastication, lingual pumping, piecemeal swallow, and incomplete oral clearance. Poor oral containment of thin, nectar (mildly), and honey (moderately) thick  liquids.     SILENT aspiration of thin liquids via tsp 2/2 premature spillage to the pyriforms and significantly delayed pharyngeal swallow initiation and incomplete epiglottic inversion/laryngeal vestibular closure. Cues to cough unsuccessful in clearing material from laryngeal vestibule. Pharyngeal deficits reduplicated with nectar (mildly) thick liquids via tsp resulting in penetration with visible residue on the underside of the epiglottis. Material was not sensed by pt and accumulated with additional tsp sip trials of nectar (mildly) thick liquids, eventually resulting in SILENT aspiration. Consistent premature spillage of honey (moderately) thick liquids to the valleculae. With initial trials of honey (moderately) thick liquids, pt with improved timeliness of pharyngeal swallow initiation/laryngeal vestibular closure resulting in only trace transient penetration. However, as trials progressed, pt with apparent fatigue e/b deep penetration of honey (moderately) thick liquids with visible residue on the underside of the epiglottis. Pt did not sense penetration and made no attempt to clear, putting her at risk of aspiration of honey (moderately) thick liquids. No penetration/aspiration with purees or regular solids.     Recommend NPO with frequent aggressive oral care. 3-5 ice chips/hr allowed for pleasure, safe swallow stimulation, and after oral care to prevent buildup of bacteria within the oropharynx. Suspect pt's dysphagia at baseline given chronic condition of COPD. Question need for GOC discussions re: accepting the risk of aspiration with oral intake. SLP will continue to follow pending GOC discussions.       [place]:  Thin: 8 - Material enters airway, passes below the folds, no effort to eject (no cough).  Nectar: 8 - Material enters airway, passes below the folds, no effort to eject (no cough).  Honey: 5 - Material enters airway, contacts the folds, not ejected from airway.  Puree: 1 - Material does  not enter airway.   Solids: 1 - Material does not enter airway.     Recommendations:  NPO with frequent aggressive oral care.  3-5 ice chips/hr allowed for pleasure, safe swallow stimulation, and after oral care to prevent buildup of bacteria within the oropharynx  Question need for ongoing GOC discussions re: accepting the risk of aspiration with oral intake  SLP will continue to follow pending GOC discussions    Goal:   Pt will tolerate least restrictive diet with no overt clinical s/s aspiration 100% of the time.        Plan:  SLP Services Indicated: Yes  Frequency: pending GOC discussions  Discussed POC with patient  SLP - OK to Discharge    Pain:   0-10  0 = No pain.     Inpatient Education:  Extensive education provided to patient regarding current swallow function, recommendations/results, and POC.      Consultations/Referrals/Coordination of Services:   GOC discussions

## 2023-12-15 LAB
ALBUMIN SERPL BCP-MCNC: 2.4 G/DL (ref 3.4–5)
ANION GAP SERPL CALC-SCNC: 16 MMOL/L (ref 10–20)
BASOPHILS # BLD AUTO: 0.03 X10*3/UL (ref 0–0.1)
BASOPHILS NFR BLD AUTO: 0.2 %
BUN SERPL-MCNC: 34 MG/DL (ref 6–23)
CALCIUM SERPL-MCNC: 8.2 MG/DL (ref 8.6–10.6)
CHLORIDE SERPL-SCNC: 105 MMOL/L (ref 98–107)
CO2 SERPL-SCNC: 23 MMOL/L (ref 21–32)
CREAT SERPL-MCNC: 0.75 MG/DL (ref 0.5–1.05)
EOSINOPHIL # BLD AUTO: 0 X10*3/UL (ref 0–0.4)
EOSINOPHIL NFR BLD AUTO: 0 %
ERYTHROCYTE [DISTWIDTH] IN BLOOD BY AUTOMATED COUNT: 16 % (ref 11.5–14.5)
GFR SERPL CREATININE-BSD FRML MDRD: 79 ML/MIN/1.73M*2
GLUCOSE SERPL-MCNC: 71 MG/DL (ref 74–99)
HCT VFR BLD AUTO: 29.9 % (ref 36–46)
HGB BLD-MCNC: 9.8 G/DL (ref 12–16)
IMM GRANULOCYTES # BLD AUTO: 0.24 X10*3/UL (ref 0–0.5)
IMM GRANULOCYTES NFR BLD AUTO: 1.4 % (ref 0–0.9)
LEGIONELLA AG UR QL: NEGATIVE
LEGIONELLA AG UR QL: NEGATIVE
LYMPHOCYTES # BLD AUTO: 0.49 X10*3/UL (ref 0.8–3)
LYMPHOCYTES NFR BLD AUTO: 2.9 %
MAGNESIUM SERPL-MCNC: 1.98 MG/DL (ref 1.6–2.4)
MCH RBC QN AUTO: 27.1 PG (ref 26–34)
MCHC RBC AUTO-ENTMCNC: 32.8 G/DL (ref 32–36)
MCV RBC AUTO: 83 FL (ref 80–100)
MONOCYTES # BLD AUTO: 0.5 X10*3/UL (ref 0.05–0.8)
MONOCYTES NFR BLD AUTO: 2.9 %
NEUTROPHILS # BLD AUTO: 15.88 X10*3/UL (ref 1.6–5.5)
NEUTROPHILS NFR BLD AUTO: 92.6 %
NRBC BLD-RTO: 0 /100 WBCS (ref 0–0)
PHOSPHATE SERPL-MCNC: 3.2 MG/DL (ref 2.5–4.9)
PLATELET # BLD AUTO: 349 X10*3/UL (ref 150–450)
POTASSIUM SERPL-SCNC: 3.7 MMOL/L (ref 3.5–5.3)
RBC # BLD AUTO: 3.62 X10*6/UL (ref 4–5.2)
S PNEUM AG UR QL: NEGATIVE
SODIUM SERPL-SCNC: 140 MMOL/L (ref 136–145)
STAPHYLOCOCCUS SPEC CULT: ABNORMAL
WBC # BLD AUTO: 17.1 X10*3/UL (ref 4.4–11.3)

## 2023-12-15 PROCEDURE — 1100000001 HC PRIVATE ROOM DAILY

## 2023-12-15 PROCEDURE — 99223 1ST HOSP IP/OBS HIGH 75: CPT

## 2023-12-15 PROCEDURE — G0316 PR PROLONGED INPATIENT/OBSERVATION EM SVC EA 15 MIN: HCPCS

## 2023-12-15 PROCEDURE — 80069 RENAL FUNCTION PANEL: CPT

## 2023-12-15 PROCEDURE — 2500000004 HC RX 250 GENERAL PHARMACY W/ HCPCS (ALT 636 FOR OP/ED): Performed by: STUDENT IN AN ORGANIZED HEALTH CARE EDUCATION/TRAINING PROGRAM

## 2023-12-15 PROCEDURE — 36415 COLL VENOUS BLD VENIPUNCTURE: CPT

## 2023-12-15 PROCEDURE — 83735 ASSAY OF MAGNESIUM: CPT

## 2023-12-15 PROCEDURE — 85025 COMPLETE CBC W/AUTO DIFF WBC: CPT

## 2023-12-15 PROCEDURE — 99233 SBSQ HOSP IP/OBS HIGH 50: CPT | Performed by: STUDENT IN AN ORGANIZED HEALTH CARE EDUCATION/TRAINING PROGRAM

## 2023-12-15 RX ADMIN — AZITHROMYCIN 500 MG: 500 INJECTION, POWDER, LYOPHILIZED, FOR SOLUTION INTRAVENOUS at 20:14

## 2023-12-15 RX ADMIN — CEFTRIAXONE SODIUM 1 G: 1 INJECTION, SOLUTION INTRAVENOUS at 17:36

## 2023-12-15 ASSESSMENT — COGNITIVE AND FUNCTIONAL STATUS - GENERAL
MOVING TO AND FROM BED TO CHAIR: A LITTLE
STANDING UP FROM CHAIR USING ARMS: A LITTLE
DRESSING REGULAR LOWER BODY CLOTHING: A LOT
MOVING FROM LYING ON BACK TO SITTING ON SIDE OF FLAT BED WITH BEDRAILS: A LITTLE
EATING MEALS: A LITTLE
CLIMB 3 TO 5 STEPS WITH RAILING: A LOT
DRESSING REGULAR UPPER BODY CLOTHING: A LOT
MOVING TO AND FROM BED TO CHAIR: A LITTLE
TOILETING: TOTAL
WALKING IN HOSPITAL ROOM: A LITTLE
HELP NEEDED FOR BATHING: A LOT
MOVING FROM LYING ON BACK TO SITTING ON SIDE OF FLAT BED WITH BEDRAILS: A LITTLE
STANDING UP FROM CHAIR USING ARMS: A LITTLE
TURNING FROM BACK TO SIDE WHILE IN FLAT BAD: A LITTLE
TOILETING: TOTAL
MOBILITY SCORE: 17
HELP NEEDED FOR BATHING: A LOT
DRESSING REGULAR LOWER BODY CLOTHING: A LOT
PERSONAL GROOMING: A LOT
MOBILITY SCORE: 17
WALKING IN HOSPITAL ROOM: A LITTLE
TURNING FROM BACK TO SIDE WHILE IN FLAT BAD: A LITTLE
DAILY ACTIVITIY SCORE: 12
DAILY ACTIVITIY SCORE: 12
EATING MEALS: A LITTLE
PERSONAL GROOMING: A LOT
CLIMB 3 TO 5 STEPS WITH RAILING: A LOT
DRESSING REGULAR UPPER BODY CLOTHING: A LOT

## 2023-12-15 ASSESSMENT — PAIN SCALES - GENERAL
PAINLEVEL_OUTOF10: 0 - NO PAIN
PAINLEVEL_OUTOF10: 0 - NO PAIN

## 2023-12-15 ASSESSMENT — PAIN SCALES - PAIN ASSESSMENT IN ADVANCED DEMENTIA (PAINAD)
BREATHING: NORMAL
BODYLANGUAGE: RELAXED
TOTALSCORE: 0
CONSOLABILITY: NO NEED TO CONSOLE
FACIALEXPRESSION: SMILING OR INEXPRESSIVE

## 2023-12-15 ASSESSMENT — PAIN - FUNCTIONAL ASSESSMENT: PAIN_FUNCTIONAL_ASSESSMENT: PAINAD (PAIN ASSESSMENT IN ADVANCED DEMENTIA SCALE)

## 2023-12-15 ASSESSMENT — PAIN SCALES - WONG BAKER: WONGBAKER_NUMERICALRESPONSE: NO HURT

## 2023-12-15 ASSESSMENT — ACTIVITIES OF DAILY LIVING (ADL): LACK_OF_TRANSPORTATION: NO

## 2023-12-15 NOTE — PROGRESS NOTES
Nena Ojeda is a 83 y.o. female on day 2 of admission presenting with Multifocal pneumonia.    Subjective   Received update that patient was seen by geriatrics and after further discussion, plan was made to transition patient to hospice services.    Call placed to daniele Alanis to review; she confirmed that she is interested in transitioning patient to hospice. Patient currently resides at [facility] and confirmed that she is not a PACE patient. Patient was previously enrolled in [facility] services over a year ago. She is agreeable for [facility] referral to be sent.    Referral built and sent in [facility]; spoke with [facility] RN Elizabeth who will see patient at bedside and call daughter Annabelle via phone.    -Helen BARRETO, MA, LSW  [phone] or [facility] Secure Chat  Care Transitions

## 2023-12-15 NOTE — CONSULTS
"Consults    Primary Team: Hospital Medicine    Admit Date: 12/13/2023    Emergency Contact:   Extended Emergency Contact Information  Primary Emergency Contact: Annabelle Hammond  Home Phone: [phone]  Relation: Child     Reason For Consult: [facility]     History Of Present Illness:  Nena Ojeda is a 83 y.o. female PMHx notable for autonomic orthostatic hypotension, emphysema (chart dx of COPD w/o PFT), HFpEF, GERD, rheumatoid osteoarthritis, hypertension, dyslipidemia, urinary incontinence, hypothyroidism, moderate severity dementia, and previous aspiration PNA previously admitted to the MICU for AHRF requiring AirVO. Geriatrics consulted after failed MBSS and GOC discussions.     History per patient:  Unable to provide history.     History per family/caregiver: CHRISTAL Alanis (daughter)  Patient lives in [facility] living facility in the highest level of care for the last 14 months. Has seen Dr. Camarillo and was diagnosed with vascular dementia, moderate severity with possibility comorbid Alzheimer's disease and last Scored 16/30 on MoCA 9/2022. Per Dr. Camarillo's last outpatient note 2/2023:     \"Following the patient's last visit, they were to move to a facility after rehab and pursue guardianship. I last saw her in 9/2022. She is here today with her daughter. Worsening cognition. Sometimes thinks food is getting stolen. Guardianship is almost completed. She is at [facility] now and moved there in October 2022. Aside from going to meals, she really doesn't interact with others there. Her mood is okay. She has delayed her sleep-wake cycle. Her appetite is good and she is not losing weight. No passive death wish or suicidal ideation. No hallucinations. Her daughter thinks that her cognition is more confused at times. \"    Last had a fall 3 weeks ago after she forgot she needed help and was found on the bathroom floor. Uses a wheelchair at baseline. Has 2 children, one son is estranged. CHRISTAL states that the " "patient angry that she is in assisted living. Patient requires assistance with all IADLs and most BADLs. Per daughter, the patient has been having worsening cognitive decline and was a \"danger to herself\" and was evaluated for hospice care from [facility] prior to going to the assisted living community. Per POA, the patient values quality of life and enjoys eating and feels at this time she would not want to be restricted from eating. Per POA, she would like to proceed with hospice and comfort care at this time given worsening cognitive status and worsening comorbidities requiring recent ICU admission.       What matters most to the patient: Per daughter, patient enjoys eating and values quality of life.       Prior to Admission Meds  Prior to Admission Medications   Prescriptions Last Dose Informant Patient Reported? Taking?   DULoxetine (Cymbalta) 20 mg DR capsule   Yes No   Sig: Take 1 capsule (20 mg) by mouth once daily.   acetaminophen (Tylenol) 325 mg tablet   Yes No   Sig: Take 2 tablets (650 mg) by mouth every 4 hours if needed.   acetaminophen-codeine (Tylenol w/ Codeine #3) 300-30 mg tablet   Yes No   Sig: Take 1 tablet by mouth every 12 hours if needed (for PAIN 1 to 5).   acetaminophen-codeine (Tylenol w/ Codeine #3) 300-30 mg tablet   Yes No   Sig: Take 2 tablets by mouth every 12 hours if needed (for PAIN 6 to 10).   albuterol-budesonide (Airsupra) 90-80 mcg/actuation inhaler   Yes No   Sig: Inhale 2 puffs every 4 hours if needed (shortness of breath).   amLODIPine (Norvasc) 2.5 mg tablet   Yes No   Sig: Take 1 tablet (2.5 mg) by mouth once daily.   atorvastatin (Lipitor) 40 mg tablet   Yes No   Sig: Take 1 tablet (40 mg) by mouth once daily at bedtime.   bimatoprost (Lumigan) 0.01 % ophthalmic solution   Yes No   Sig: Administer 1 drop into both eyes once daily at bedtime.   buPROPion XL (Wellbutrin XL) 150 mg 24 hr tablet   Yes No   Sig: Take 1 tablet (150 mg) by mouth every 12 hours. Do not " crush, chew, or split.   celecoxib (CeleBREX) 200 mg capsule   Yes No   Sig: Take 1 capsule (200 mg) by mouth once daily in the morning.   dextromethorphan-guaifenesin  mg/10 mL liquid in packet   Yes No   Sig: Take 15 mL by mouth 3 times a day. For 7 days ending 12/19/23   docusate sodium (Colace) 100 mg capsule   Yes No   Sig: Take 1 capsule (100 mg) by mouth 2 times a day.   estradiol (Estrace) 0.5 mg tablet   Yes No   Sig: Take 1 tablet (0.5 mg) by mouth once daily in the morning.   ezetimibe (Zetia) 10 mg tablet   Yes No   Sig: Take 1 tablet (10 mg) by mouth once daily at bedtime.   fesoterodine (Toviaz) 8 mg tablet extended release 24 hr   Yes No   Sig: Take 1 tablet (8 mg) by mouth once daily at bedtime.   fluticasone propion-salmeteroL (Advair Diskus) 500-50 mcg/dose diskus inhaler   Yes No   Sig: Inhale 1 puff 2 times a day.   furosemide (Lasix) 20 mg tablet   Yes No   Sig: Take 1 tablet (20 mg) by mouth once daily.   levothyroxine (Synthroid, Levoxyl) 125 mcg tablet   Yes No   Sig: Take 1 tablet (125 mcg) by mouth once daily.   lidocaine 3 % cream   Yes No   Sig: Apply 1 Application topically every 8 hours if needed (to legs topically).   magnesium oxide (Mag-Ox) 400 mg tablet   Yes No   Sig: Take 1 tablet (400 mg) by mouth once daily.   melatonin 3 mg tablet   Yes No   Sig: Take 1 tablet (3 mg) by mouth once daily at bedtime.   omeprazole (PriLOSEC) 40 mg DR capsule   Yes No   Sig: Take 1 capsule (40 mg) by mouth 2 times a day.   polyethylene glycol (Glycolax, Miralax) 17 gram packet   Yes No   Sig: Take 17 g by mouth once daily.   potassium chloride CR 10 mEq ER tablet   Yes No   Sig: Take 2 tablets (20 mEq) by mouth once daily.   sertraline (Zoloft) 50 mg tablet   Yes No   Sig: Take 1 tablet (50 mg) by mouth once daily.   tiotropium (Spiriva) 18 mcg inhalation capsule   Yes No   Sig: Place 1 capsule (18 mcg) into inhaler and inhale once daily.      Facility-Administered Medications: None         Current Meds in Hospital  Current Facility-Administered Medications   Medication Dose Route Frequency Provider Last Rate Last Admin    [MAR Hold] atorvastatin (Lipitor) tablet 80 mg  80 mg oral Nightly Vargas Tavares MD        azithromycin (Zithromax) in dextrose 5 % in water (D5W) 250 mL  mg  500 mg intravenous q24h Theresa Gray  mL/hr at 12/15/23 0400 Rate Verify at 12/15/23 0400    cefTRIAXone (Rocephin) IVPB 1 g  1 g intravenous q24h Theresa Gray  mL/hr at 12/15/23 0400 Rate Verify at 12/15/23 0400    [MAR Hold] docusate sodium (Colace) capsule 100 mg  100 mg oral BID Vargas Tavares MD   100 mg at 12/14/23 1018    [MAR Hold] enoxaparin (Lovenox) syringe 30 mg  30 mg subcutaneous q24h Vargas Tavares MD   30 mg at 12/14/23 0615    [MAR Hold] ezetimibe (Zetia) tablet 10 mg  10 mg oral Nightly Vargas Tavares MD        [MAR Hold] ipratropium-albuteroL (Duo-Neb) 0.5-2.5 mg/3 mL nebulizer solution 3 mL  3 mL nebulization q6h PRN Theresa Gray MD        [MAR Hold] latanoprost (Xalatan) 0.005 % ophthalmic solution 1 drop  1 drop Both Eyes Nightly Vargas Tavares MD        [MAR Hold] levothyroxine (Synthroid, Levoxyl) tablet 125 mcg  125 mcg oral Daily Vargas Tavares MD        [MAR Hold] melatonin tablet 3 mg  3 mg oral Nightly Vargas Tavares MD        [MAR Hold] ondansetron (Zofran) injection 4 mg  4 mg intravenous Once PRN Kassy Abraham MD        oxygen (O2) therapy   inhalation Continuous PRN - O2/gases Vargas Tavares MD   30 L/min at 12/14/23 0500    [MAR Hold] pantoprazole (ProtoNix) EC tablet 40 mg  40 mg oral Daily before breakfast Vargas Tavares MD   40 mg at 12/14/23 1018    predniSONE (Deltasone) tablet 40 mg  40 mg oral Daily Theresa Gray MD   40 mg at 12/14/23 1018    [MAR Hold] sertraline (Zoloft) tablet 50 mg  50 mg oral Daily Vargas Tavares MD   50 mg at 12/14/23 1018    [MAR Hold] tiotropium (Spiriva Respimat) 2.5 mcg/actuation inhaler 2 puff  2 Inhalation inhalation Daily Vargas  MD Chaitanya              Past Medical History  Past Medical History:   Diagnosis Date    Acute kidney failure, unspecified (CMS/HCC) 04/20/2019    WALESKA (acute kidney injury)    Adverse effect of other specified systemic anti-infectives and antiparasitics, initial encounter 06/29/2021    Plaquenil adverse reaction in therapeutic use    Aftercare following joint replacement surgery 06/04/2018    Aftercare following knee joint replacement surgery    Age-related nuclear cataract, left eye 06/04/2018    Age-related nuclear cataract of left eye    Age-related nuclear cataract, right eye 06/04/2018    Age-related nuclear cataract of right eye    Bilateral primary osteoarthritis of hip 06/04/2018    Primary osteoarthritis of both hips    Cellulitis of left lower limb 09/30/2021    Cellulitis of leg without foot, left    Cervical disc disorder, unspecified, unspecified cervical region 06/04/2018    Cervical disc disease    Flat foot (pes planus) (acquired), unspecified foot 06/04/2018    Acquired pes planovalgus    Insomnia, unspecified 06/04/2018    Insomnia    Nocturnal polyuria 10/28/2020    Nocturnal polyuria    Other amnesia 06/04/2018    Memory loss    Other conditions influencing health status 04/20/2019    Abdominal abscess    Other specified anxiety disorders 10/23/2019    Depression with anxiety    Other symptoms and signs involving the nervous system 06/28/2018    Transient neurological symptoms    Paresthesia of skin 10/11/2020    Paresthesia of left upper extremity    Personal history of diseases of the blood and blood-forming organs and certain disorders involving the immune mechanism 05/18/2017    History of anemia    Personal history of diseases of the skin and subcutaneous tissue 03/27/2022    History of cellulitis    Personal history of other diseases of the digestive system 10/23/2019    History of cholecystitis    Personal history of other diseases of the digestive system 10/23/2019    History of irritable  bowel syndrome    Personal history of other diseases of the digestive system 07/07/2020    History of gastritis    Personal history of other diseases of the nervous system and sense organs 10/05/2020    History of neuropathy    Personal history of other diseases of the nervous system and sense organs 05/18/2017    History of sleep apnea    Personal history of other infectious and parasitic diseases 04/20/2019    History of sepsis    Personal history of other specified conditions 05/21/2019    History of bradycardia    Personal history of other specified conditions 10/14/2019    History of nocturia    Personal history of pneumonia (recurrent) 05/21/2019    History of community acquired pneumonia    Personal history of transient ischemic attack (TIA), and cerebral infarction without residual deficits 05/04/2018    History of transient ischemic attack    Posterior tibial tendinitis, unspecified leg 03/30/2018    Posterior tibial tendon dysfunction    Posterior tibial tendinitis, unspecified leg 06/04/2018    Posterior tibial tendon dysfunction    Primary open-angle glaucoma, right eye, moderate stage 01/31/2018    Primary open angle glaucoma of right eye, moderate stage    Primary osteoarthritis, unspecified hand 09/25/2019    Arthritis of hand    Radiculopathy, lumbar region 02/05/2019    Acute lumbar radiculopathy    Strain of muscle(s) and tendon(s) of the rotator cuff of right shoulder, initial encounter 01/19/2021    Strain of right rotator cuff capsule, initial encounter    Transient cerebral ischemic attack, unspecified 12/09/2020    TIA (transient ischemic attack)    Unilateral primary osteoarthritis, right hip 06/04/2018    Arthritis of right hip    Unspecified injury of head, sequela 02/26/2021    Head trauma, sequela    Unspecified rotator cuff tear or rupture of right shoulder, not specified as traumatic 05/18/2017    Rotator cuff tear arthropathy of right shoulder    Urgency of urination 06/04/2018     Urgency of urination    Urinary tract infection, site not specified 06/04/2018    UTI (urinary tract infection)    Urinary tract infection, site not specified 06/04/2018    Acute lower UTI        Surgical History  Past Surgical History:   Procedure Laterality Date    COLONOSCOPY  07/07/2015    Complete Colonoscopy    CT ABDOMEN PELVIS ANGIOGRAM W AND/OR WO IV CONTRAST  11/9/2018    CT ABDOMEN PELVIS ANGIOGRAM W AND/OR WO IV CONTRAST 11/9/2018 [facility]    HAND SURGERY  09/23/2014    Hand Surgery                                                                                                                                                          HYSTERECTOMY  09/25/2015    Hysterectomy    IR ANGIOGRAM AORTA ABDOMEN  4/10/2017    IR ANGIOGRAM AORTA ABDOMEN 4/10/2017 [facility] AIB LEGAC[facility]    IR INTERVENTION NEURO STENT  4/10/2017    IR INTERVENTION NEURO STENT 4/10/2017 [facility]IB [facility]GAC[facility]    LUMBAR DISCECTOMY  09/14/2013    Spinal Diskectomy    OTHER SURGICAL HISTORY  09/14/2013    Salpingo-oophorectomy Left Side    OTHER SURGICAL HISTORY  06/06/2017    Transcath Intravasc Stent Placement Percutaneous Mesenteric    OTHER SURGICAL HISTORY  09/25/2015    Leg Repair    OTHER SURGICAL HISTORY  09/23/2014    Electronic Bladder Stimulator        Family History  Her family history is not on file.     Social History  She has no history on file for tobacco use, alcohol use, and drug use.  -Alcohol use: No  -Tobacco use: No  -Illicit drug use: No  -Exercise: none  -Spiritual needs: none    Occupation:   Highest Level of Education: Some College (1 year)  Community Resources: None   [place]: No  Current living environment: [place] [facility]       Activities of Daily Living:  Basic ADLs: (I= independent, A= assistance, D= dependent)  Bathing: D , Dressing: D , Toileting: D , Transferring: D , Continence: D , Feeding: I   Velasquez Index:  1  Instrumental ADLs: (I= independent, A=  assistance, D= dependent)  Ability to use phone: D , Shopping: D , Cooking: D , Housekeeping: D , Laundry: D , Transportation: D , Medications: D , Handle Finances: D    [place] Scale:  6    Allergies  Penicillins, Ace inhibitors, Cephalexin, Cephalosporins, Donepezil, Gabapentin, Hydroxychloroquine sulfate, Mirabegron, Mirtazapine, Moxifloxacin, Sulfamethoxazole-trimethoprim, Ciprofloxacin, and Methotrexate    Review of Systems   Unable to obtain from patient.     Documents on file and valid:  Advance Directive/Living Will: No   Health Care Power of : Yes daughter- Annabelle   Code Status: DNR and No Intubation      Confusion Assessment Method (CAM)  Not on file.    Zeke Cognitive Assessment (MoCA)  16/30 on MoCA 9/2022    Geriatric Depression Scale (GDS)  Not on file.    Mini-Cog (Early Dementia Detection)  Not on file.    Folstein Mini-Mental State Exam (MMSE)  Not on file.    Patient Health Questionnaire (PHQ-9)  Not on file.     Physical Exam  Constitutional:       Appearance: She is ill-appearing.      Comments: Frail, thin   HENT:      Head: Normocephalic.   Eyes:      Extraocular Movements: Extraocular movements intact.      Conjunctiva/sclera: Conjunctivae normal.   Cardiovascular:      Rate and Rhythm: Normal rate and regular rhythm.   Pulmonary:      Effort: Pulmonary effort is normal.   Abdominal:      General: Abdomen is flat. Bowel sounds are normal.      Palpations: Abdomen is soft.   Musculoskeletal:      Cervical back: Normal range of motion.   Skin:     General: Skin is warm.   Neurological:      Mental Status: She is alert.      Comments: Aox2 (name, location)         Last Recorded Vitals      12/14/2023     3:00 PM 12/14/2023     4:00 PM 12/14/2023     4:04 PM 12/14/2023     9:02 PM 12/15/2023     4:43 AM 12/15/2023     4:49 AM 12/15/2023     1:26 PM   Vitals   Systolic  136  113 107  129   Diastolic  67  62 49  50   Heart Rate 86 105  77 67 97 58   Temp   36.3 °C (97.3 °F) 36.8 °C (98.2  °F) 36.6 °C (97.9 °F)  37.1 °C (98.8 °F)   Resp 20 37  18 18  17      Vitals:    12/14/23 0332   Weight: 49.3 kg (108 lb 11 oz)        Confusion Assessment Method(CAM) for diagnosis of delirium:    1.  Acute onset or fluctuating course: absent/present: Absent  2.  Inattention: absent/present: Absent  3.  Disorganized thinking: absent/present: Present  4.  Altered level of consciousness: absent/present: Absent  CAM: negative    AT Score For Assessment of Delirium and Cognitive Impairment:    Alertness: 0  Normal(fully alert,but not agitated, throughout assessment)=0  Mild sleepiness for <10 seconds after walking, then normal=0  Clearly abnormal=4  2.  AMT4: 1  No mistakes=0  One mistake=1  Two or more mistakes/untestable=2  3.  Attention: 0  Achieves seven months or more correctly=0  Starts but scores <7 months/ refuses to start=1  Untestable(cannot start because unwell, drowsy, inattentive)=2  4.  Acute: 0  No=0  Yes=4    Total Score: 1  4 or above: Possible delirium +/- cognitive impairment  1-3: Possible cognitive impairment  0: Delirium or severe cognitive impairment unlikely(but delirium still possible if (4) information incomplete)     Relevant Results  TSH   Date Value Ref Range Status   09/16/2022 18.83 (H) 0.44 - 3.98 mIU/L Final     Comment:      TSH testing is performed using different testing    methodology at [facility] than at other    [facility]. Direct result comparisons should    only be made within the same method.     07/08/2022 6.05 (H) 0.44 - 3.98 mIU/L Final     Comment:      TSH testing is performed using different testing    methodology at [facility] than at other    [facility]. Direct result comparisons should    only be made within the same method.     04/12/2022 0.73 0.44 - 3.98 mIU/L Final     Comment:      TSH testing is performed using different testing    methodology at [facility] than at other    [facility]. Direct result  comparisons should    only be made within the same method.       Vitamin B-12   Date Value Ref Range Status   04/12/2022 561 211 - 911 pg/mL Final   07/21/2021 554 211 - 911 pg/mL Final   10/06/2020 745 211 - 911 pg/mL Final     Vitamin D, 25-Hydroxy   Date Value Ref Range Status   07/08/2022 32 ng/mL Final     Comment:     .  DEFICIENCY:         < 20   NG/ML  INSUFFICIENCY:      20-29  NG/ML  SUFFICIENCY:         NG/ML    THIS ASSAY ACCURATELY QUANTIFIES THE SUM OF  VITAMIN D3, 25-HYDROXY AND VIT D2,25-HYDROXY.     01/07/2021 56 ng/mL Final     Comment:     .  DEFICIENCY:         < 20   NG/ML  INSUFFICIENCY:      20-29  NG/ML  SUFFICIENCY:         NG/ML    THIS ASSAY ACCURATELY QUANTIFIES THE SUM OF  VITAMIN D3, 25-HYDROXY AND VIT D2,25-HYDROXY.     10/06/2020 75 ng/mL Final     Comment:     .  DEFICIENCY:         < 20   NG/ML  INSUFFICIENCY:      20-29  NG/ML  SUFFICIENCY:         NG/ML    THIS ASSAY ACCURATELY QUANTIFIES THE SUM OF  VITAMIN D3, 25-HYDROXY AND VIT D2,25-HYDROXY.       Hemoglobin A1C   Date Value Ref Range Status   07/08/2022 5.9 (A) % Final     Comment:          Diagnosis of Diabetes-Adults   Non-Diabetic: < or = 5.6%   Increased risk for developing diabetes: 5.7-6.4%   Diagnostic of diabetes: > or = 6.5%  .       Monitoring of Diabetes                Age (y)     Therapeutic Goal (%)   Adults:          >18           <7.0   Pediatrics:    13-18           <7.5                   7-12           <8.0                   0- 6            7.5-8.5   American Diabetes Association. Diabetes Care 33(S1), Jan 2010.     10/06/2020 5.4 % Final     Comment:          Diagnosis of Diabetes-Adults   Non-Diabetic: < or = 5.6%   Increased risk for developing diabetes: 5.7-6.4%   Diagnostic of diabetes: > or = 6.5%  .       Monitoring of Diabetes                Age (y)     Therapeutic Goal (%)   Adults:          >18           <7.0   Pediatrics:    13-18           <7.5                   7-12            <8.0                   0- 6            7.5-8.5   American Diabetes Association. Diabetes Care 33(S1), Jan 2010.     05/22/2019 5.0 % Final     Comment:          Diagnosis of Diabetes-Adults   Non-Diabetic: < or = 5.6%   Increased risk for developing diabetes: 5.7-6.4%   Diagnostic of diabetes: > or = 6.5%  .       Monitoring of Diabetes                Age (y)     Therapeutic Goal (%)   Adults:          >18           <7.0   Pediatrics:    13-18           <7.5                   7-12           <8.0                   0- 6            7.5-8.5   American Diabetes Association. Diabetes Care 33(S1), Jan 2010.       Lab Results   Component Value Date    WBC 17.1 (H) 12/15/2023    HGB 9.8 (L) 12/15/2023    HCT 29.9 (L) 12/15/2023     12/15/2023    CHOL 96 07/08/2022    TRIG 49 07/08/2022    HDL 37.8 (A) 07/08/2022    ALT 23 12/14/2023    AST 51 (H) 12/14/2023     12/15/2023    K 3.7 12/15/2023     12/15/2023    CREATININE 0.75 12/15/2023    BUN 34 (H) 12/15/2023    CO2 23 12/15/2023    TSH 18.83 (H) 09/16/2022    INR 2.2 (H) 12/13/2023    HGBA1C 5.9 (A) 07/08/2022       Recent Imaging Results      FL modified barium swallow study  Narrative: Interpreted By:  Jamal Benitez,   STUDY:  FL MODIFIED BARIUM SWALLOW STUDY;; 12/14/2023 1:45 pm      INDICATION:  Signs/Symptoms:concern for aspiration pneumonia, failed bedside  swallow eval w/ SLP.      COMPARISON:  CT head dated 05/07/2023.      ACCESSION NUMBER(S):  [id number]      ORDERING CLINICIAN:  MARYCARMEN HAYNES      TECHNIQUE:  MBSS completed. Informed verbal consent obtained prior to completion  of exam. Trials of thin, nectar thick, honey thick, puree,  soft-solids, and regular solids given.      SLP: Jamal Benitez  Phone/Pager: [phone]  Fluoroscopy time: 5.2 minutes.      SPEECH FINDINGS:  Speech-Language Pathology  Adult Inpatient Modified Barium Swallow Study (MBSS)      Patient Name: Nena Ojeda  MRN: [id number]  Today's Date: 12/14/2023  Start Time:  1300  Stop Time: 1345  Time Calculation (min): 45      Initial MBSS: Yes      Respiratory Status: nasal cannula, NAD      History of Present Illness:  Nena Ojeda is a 83 y.o. female being admitted to the MICU for  AHRF requiring AirVO. PMHx notable for autonomic orthostatic  hypotension, emphysema (chart dx of COPD w/o PFT). HFpEF?, acid  reflux disease, rheumatoid osteoarthritis, hypertension,  dyslipidemia, , urinary incontinence, hypothyroidism, moderate  severity dementia, and previous aspiration PNA. On arrival, patient  ill appearing and not interactive. History obtained from daughter  Annabelle. Patient resides at [facility] term assisted living ([facility]) and  presented to the ED this evening because she felt exceedingly tired  at her meal and found to have low pulse oximetry reading. Daughter  reports that she visits her mother once per week. Over the past 2  weeks patient with cough and feeling tired. No report of fever, SOB  or chest pain. Not aware of any issues swallowing in the past. Also  reports patient with worsening dementia over the past year (MOCA 28  to 18) and increasing need for assistance (I.e. walker to wheelchair).      Impression: Impression:  Modified Barium Swallow Study completed. Verbal consent obtained. Pt  alert, cooperative, and able to follow commands throughout study.      Pt with severe oropharyngeal dysphagia. Trials of thin liquids,  nectar (mildly) thick liquids, honey (moderately) thick liquids,  purees, and regular solids were given. Pt with adequate oral  acceptance of all consistencies/trials. Impaired oral management of  purees and regular solids e/b significantly prolonged bolus  manipulation/mastication, lingual pumping, piecemeal swallow, and  incomplete oral clearance. Poor oral containment of thin, nectar  (mildly), and honey (moderately) thick liquids.      SILENT aspiration of thin liquids via tsp 2/2 premature spillage to  the pyriforms and significantly delayed  pharyngeal swallow initiation  and incomplete epiglottic inversion/laryngeal vestibular closure.  Cues to cough unsuccessful in clearing material from laryngeal  vestibule. Pharyngeal deficits reduplicated with nectar (mildly)  thick liquids via tsp resulting in penetration with visible residue  on the underside of the epiglottis. Material was not sensed by pt and  accumulated with additional tsp sip trials of nectar (mildly) thick  liquids, eventually resulting in SILENT aspiration. Consistent  premature spillage of honey (moderately) thick liquids to the  valleculae. With initial trials of honey (moderately) thick liquids,  pt with improved timeliness of pharyngeal swallow  initiation/laryngeal vestibular closure resulting in only trace  transient penetration. However, as trials progressed, pt with  apparent fatigue e/b deep penetration of honey (moderately) thick  liquids with visible residue on the underside of the epiglottis. Pt  did not sense penetration and made no attempt to clear, putting her  at risk of aspiration of honey (moderately) thick liquids. No  penetration/aspiration with purees or regular solids.      Recommend NPO with frequent aggressive oral care. 3-5 ice chips/hr  allowed for pleasure, safe swallow stimulation, and after oral care  to prevent buildup of bacteria within the oropharynx. Suspect pt's  dysphagia at baseline given chronic condition of COPD. Question need  for GOC discussions re: accepting the risk of aspiration with oral  intake. SLP will continue to follow pending GOC discussions.      [place]:  Thin: 8 - Material enters airway, passes below the folds, no effort  to eject (no cough). Nectar: 8 - Material enters airway, passes below  the folds, no effort to eject (no cough). Honey: 5 - Material enters  airway, contacts the folds, not ejected from airway. Puree: 1 -  Material does not enter airway. Solids: 1 - Material does not enter  airway.      Recommendations:  NPO with  frequent aggressive oral care.  . 3-5 ice chips/hr allowed for pleasure, safe swallow stimulation,  and after oral care to prevent buildup of bacteria within the  oropharynx . Question need for ongoing GOC discussions re: accepting  the risk of aspiration with oral intake . SLP will continue to follow  pending GOC discussions      Goal:  Pt will tolerate least restrictive diet with no overt clinical s/s  aspiration 100% of the time.          Plan:  SLP Services Indicated: Yes  Frequency: pending GOC discussions  Discussed POC with patient  SLP - OK to Discharge      Pain:  0-10  0 = No pain.      Inpatient Education:  Extensive education provided to patient regarding current swallow  function, recommendations/results, and POC.      Consultations/Referrals/Coordination of Services:  GOC discussions          Speech Therapy section of this report signed by Jamal Benitez on  12/14/2023 at 3:22 pm.      RADIOLOGY FINDINGS:  Multilevel degenerative changes of the visualized spine.  No fluoroscopic evidence of acute abnormality.      Radiology section of this report signed by Dr. Minerva Willis.      IMPRESSION:  Swallow study as dictated by SLP above.  No fluoroscopic evidence of acute abnormality.      I personally reviewed the images/study and I agree with the findings  as stated by Dr. Austyn Victor. This study was interpreted at  [facility], [place], [place].          Dictation workstation:   [id number]  ECG 12 Lead  Normal sinus rhythm  Normal ECG  When compared with ECG of 19-FEB-2022 21:00,  Previous ECG has undetermined rhythm, needs review    See ED provider note for full interpretation and clinical correlation  Confirmed by Wojciech Seth ([id number]) on 12/14/2023 5:23:25 AM      Head/Brain Imaging  === Results for orders placed in visit on 10/21/21 ===    CT HEAD WO IV CONTRAST [[id number]] 10/21/2021    Status: Normal  No CT evidence of acute intracranial pathology.    Stable  intracranial findings since comparison head CT 09/01/2021.  No results found for this or any previous visit.        DATA:  EKG: QTC  Encounter Date: 12/13/23   ECG 12 Lead   Result Value    Ventricular Rate 97    Atrial Rate 97    ME Interval 130    QRS Duration 66    QT Interval 324    QTC Calculation(Bazett) 411    P Axis 66    R Axis 0    T Axis 71    QRS Count 16    Q Onset 225    P Onset 160    P Offset 209    T Offset 387    QTC Fredericia 380    Narrative    Normal sinus rhythm  Normal ECG  When compared with ECG of 19-FEB-2022 21:00,  Previous ECG has undetermined rhythm, needs review    See ED provider note for full interpretation and clinical correlation  Confirmed by Wojciech Seth ([id number]) on 12/14/2023 5:23:25 AM     Anti-psychotics in 48 hours: none   Opioids/Benzodiazepines in 48 hours: None   Anticholinergics on board:No  Restraints:No  Indwelling catheters:No  Last BM:  unknown  UO in 24 hours: +150   Activity in the past 24 hours: None  Need for ambulatory devices: None    Assessment/Plan   Nena Ojeda is a 83 y.o. female PMHx notable for autonomic orthostatic hypotension, emphysema (chart dx of COPD w/o PFT). HFpEF, GERD, rheumatoid osteoarthritis, hypertension, dyslipidemia, urinary incontinence, hypothyroidism, moderate severity dementia, and previous aspiration PNA previously admitted to the MICU for AHRF requiring AirVO. Geriatrics consulted after failed MBSS and GOC discussions.   Given worsening cognitive status impacting all IADLs and most BADLs and history of aspiration PNA requiring ICU admission, POA (Annabelle collier) would like to proceed with comfort care measures.     #Vascular dementia, moderate severity with possibility comorbid Alzheimer's disease  #Depression, On Cymbalta, Zoloft and Bupropion  :: Followed by Dr. Camarillo, last MoCA 16/30 2022  - Worsening cognitive status per daughter and last outpatient geriatric psychiatry note  - Per daughter, patient is dependent for all  IADLs and most BADLs besides feeding, lives in [facility] assisted living, is a total care.   - Per discussion with Annabelle (daughter, POA), would like to proceed with comfort care measures given patient's worsening cognitive status     Please use delirium precautions  -Bright lights during the day, keeps blinds up, switch all lights on   -Avoid disturbances at night. Encourage at least 6 hours uninterrupted sleep. Consider d/c 4am vitals check  -Avoid benzodiazepines, sedatives. Minimize opioids   -Avoid anti-cholinergics    -Avoid restraints. D/c perea if possible.   -Use low dose haldol 0.5mg PO (IM if PO not possible) only PRN severe agitation where pt exhibits volatile behavior and is a threat to self or others. EKGs to monitor Qtc.  -Daily orientation to time and place by the staff   -Out of bed to chair few hours everyday  -Encourage stimulating activities during the day if possible      #Multi-focal PNA, improving    #Hx of Aspiration PNA  #Dysphagia, failed MBSS in the setting of cognitive   -Failed MBSS, Geriatrics consulted for GOC  - Per discussion with Annabelle (daughter, POA), would like to proceed with comfort care measures given patient's worsening cognitive status   - Recommend to continue with thick liquids and advance diet as tolerated for comfort care, appreciate any recommendations from SLP   -Agree with C/w CTX and azithromycin at this time      #Constipation  :: Unknown last BM   - Recommend bowel regimen with docusate and senna   - Recommend daily recording of bowel movements, aim for 1-2 Bm daily     #HTN  #HFpEF, EF 65% 7/2022   ::echo 07/2022: EF 65%, LA dilation, moderate aortic stenosis  -Hold losartan and diuretic I/s/o of WALESKA  -Agree with keeping patient net even daily      #Emphysema   ::Present on previous CT, chart diagnosis of COPD however no PFT  ::VBG negative for hypercapia  -C/w home inhaler, if does not improve can consider starting duonebs       #Hypothyroidism   - c/w home synthroid      Medication Evaluation:   High risk medications: Zoloft, Cymbalta, Bupropion, fesoterodine - recommend holding these meds at discharge. Patient has not received them since being admitted.   Other concerning issues regarding medications: None     Care Transitions:  -Recommended level for discharge: Hospice care on discharge   -Home going considerations: Hospice care, would need to finish antibiotics     Goals of Care:  -Primary goals: Comfort care   -Health care power of : Annabelle, daughter   -Living will:Yes  Code status: DNR DNI     Geriatric medicine will continue to follow the patient. Thank you for allowing geriatric medicine to be involved in the care of your patient. Geriatric medicine consultation team is available during work hours Monday through Friday. For any emergency issues requiring immediate assistance over the weekend, please page Geriatrics pager [phone]    Above recommendations not final until attested by attending physician, Dr. Armstrong.     Consult Billing Time  Prep time on date of patient encounter(minutes):35  Time directly with patient/family/caregiver(minutes):50  Documentation time(minutes):30  TOTAL TIME(minutes):115        Molly Hough MD

## 2023-12-15 NOTE — CARE PLAN
Problem: Skin  Goal: Participates in plan/prevention/treatment measures  Outcome: Progressing  Goal: Prevent/manage excess moisture  Outcome: Progressing  Goal: Prevent/minimize sheer/friction injuries  Outcome: Progressing  Goal: Promote/optimize nutrition  Outcome: Progressing  Goal: Promote skin healing  Outcome: Progressing  Flowsheets (Taken 12/15/2023 0419)  Promote skin healing: Turn/reposition every 2 hours/use positioning/transfer devices   The patient's goals for the shift include      The clinical goals for the shift include Pt will remain free from fall during this shift

## 2023-12-15 NOTE — PROGRESS NOTES
12/15/23 1308   Discharge Planning   Living Arrangements Alone   Support Systems Children   Assistance Needed per dtr pt is primarily wheelchair bound   Type of Residence Assisted living   Care Facility Name [facility]   Who is requesting discharge planning? Provider   Home or Post Acute Services Post acute facilities (Rehab/SNF/etc)   Type of Post Acute Facility Services Skilled nursing   Patient expects to be discharged to: ?[facility] if able to accept   Does the patient need discharge transport arranged? Yes  (pt will need transport via ambulance)   RoundTrip coordination needed? Yes   Financial Resource Strain   How hard is it for you to pay for the very basics like food, housing, medical care, and heating? Not hard   Housing Stability   In the last 12 months, was there a time when you were not able to pay the mortgage or rent on time? N   In the last 12 months, was there a time when you did not have a steady place to sleep or slept in a shelter (including now)? N   Transportation Needs   In the past 12 months, has lack of transportation kept you from medical appointments or from getting medications? no   In the past 12 months, has lack of transportation kept you from meetings, work, or from getting things needed for daily living? No   Patient Choice   Provider Choice list and CMS website (https://medicare.gov/care-compare#search) for post-acute Quality and Resource Measure Data were provided and reviewed with: Family       PCP: Jocelyn Beltran from [facility] comes to see pt at [facility].  DATE OF LAST VISIT: 2-3 mths ago   PHARMACY: [facility] provides pt meds  MEDICATIONS AFFORDABLE: yes   RECENT FALLS: per dtr, pt fell recently, denies any injuries   EQUIPMENT USED IN HOME: wheelchair/wheeled walker/rollator   HOME O2/CPAP/NEBS: N/A  Dtr denies pt having any current HC services     Address, phone and emergency contact information verified and updated. TCC spoke to pt's dtr, Annabelle regarding care team reccs  for MOD intensity and dtr agreeable. FOC is [facility], will send referral. All questions and concerns answered. Will continue to follow for discharge needs.

## 2023-12-15 NOTE — PROGRESS NOTES
Assessment/Plan   Nena Ojeda is a 83 y.o. female being admitted to the MICU for AHRF requiring AirVO.PMHx notable for autonomic orthostatic hypotension, emphysema (chart dx of COPD w/o PFT). HFpEF, acid reflux disease, rheumatoid osteoarthritis, hypertension, dyslipidemia, , urinary incontinence, hypothyroidism, moderate severity dementia, and previous aspiration PNA.   On arrival, patient ill appearing and not interactive. History obtained from daughter Annabelle. Patient resides at [facility] term assisted living ([facility]) and presented to the ED this evening because she felt exceedingly tired at her meal and found to have low pulse oximetry reading. Daughter reports that she visits her mother once per week. Over the past 2 weeks patient with cough and feeling tired. No report of fever, SOB or chest pain. Not aware of any issues swallowing in the past. Also reports patient with worsening dementia over the past year (MOCA 28 to 18) and increasing need for assistance (I.e. walker to wheelchair). In the MICU, patient was transitioned from AirVO to 4LNC, treated as an aspiration pneumonia with ceftriaxone/azithro and COPD exacerbation. Mental status back to baseline.  Had SLP evaluation and still with signs of aspiration, geriatrics involved for goals of care, after goals of care discussion family electing to consult hospice.  Social work/TCC involved, considering DC to SNF when ready..    # Acute hypoxemic respiratory failure secondary to aspiration pneumonia in setting of chronic COPD  # Dysphagia  -Weaned off Airvo, now on 4 L nasal cannula  -Continuing ceftriaxone and azithromycin  -Did not pass for diet, geriatrics consulted for goals of care, electing hospice at this time  -Frequent oral hygiene, bronchopulmonary hygiene, continue home inhalers and DuoNeb as needed.    -Continue to wean oxygen as tolerated  Will discuss starting diet when decision regarding hospice and comfort care is made    # Acute encephalopathy,  "metabolic  # Dementia  -Delirium precautions  -Overall improved    #HTN  #HFpEF  -Hold losartan and diuretic I/s/o of WALESKA. BNP elevated however dry on exam.   -Consider keeping patient net even daily to avoid additional culprit to hypoxemia      #Depression: Hold sertraline    #Insomnia: Hold melatonin    #DLD  -Hold statin     #GERD: C/w PPI     #Hypothyroidism - c/w home synthroid      Scheduled outpatient appointments in system:   Future Appointments   Date Time Provider Department Center   [facility]20[facility]2023  [facility] [id number]   12/20/2023  3:00 PM Joe Camarillo MD [id number] [facility]     ---------------------------------------------------------------------------------------------------  Subjective   No events, mentation appears close to her baseline.  She denies shortness of breath but is on 4 L, reports some shortness of breath.  Discussed some goals of care but patient not able to fully participate, she did states she does not want intubation if at all possible.  Geriatrics to continue goals of care with family, patient recommendations.     ---------------------------------------------------------------------------------------------------  Objective   Last Recorded Vitals  Blood pressure 129/50, pulse 58, temperature 37.1 °C (98.8 °F), temperature source Temporal, resp. rate 17, height 1.549 m (5' 1\"), weight 49.3 kg (108 lb 11 oz), SpO2 94 %.  Intake/Output last 3 Shifts:  I/O last 3 completed shifts:  In: 550 (11.2 mL/kg) [IV Piggyback:550]  Out: 150 (3 mL/kg) [Urine:150 (0.1 mL/kg/hr)]  Dosing Weight: 49.3 kg     Physical Exam  Vitals and nursing note reviewed.   Constitutional:       General: She is not in acute distress.     Appearance: She is ill-appearing. She is not toxic-appearing.      Comments: Elderly, frail female   HENT:      Head: Normocephalic and atraumatic.      Mouth/Throat:      Mouth: Mucous membranes are moist.   Eyes:      General: No scleral icterus.     " Extraocular Movements: Extraocular movements intact.      Conjunctiva/sclera: Conjunctivae normal.   Cardiovascular:      Rate and Rhythm: Normal rate and regular rhythm.      Heart sounds: S1 normal and S2 normal. No murmur heard.  Pulmonary:      Effort: Pulmonary effort is normal. No respiratory distress.      Comments: Coarse breath sounds bilaterally but equal  Abdominal:      General: Bowel sounds are normal. There is no distension.      Palpations: Abdomen is soft.      Tenderness: There is no abdominal tenderness. There is no guarding or rebound.   Musculoskeletal:         General: No swelling or deformity.      Cervical back: Neck supple.   Skin:     General: Skin is warm and dry.      Findings: No rash.   Neurological:      Mental Status: She is alert. Mental status is at baseline.   Psychiatric:         Mood and Affect: Mood normal.         Relevant Results  Lab Results   Component Value Date    WBC 17.1 (H) 12/15/2023    HGB 9.8 (L) 12/15/2023    HCT 29.9 (L) 12/15/2023    MCV 83 12/15/2023     12/15/2023      Lab Results   Component Value Date    GLUCOSE 71 (L) 12/15/2023    CALCIUM 8.2 (L) 12/15/2023     12/15/2023    K 3.7 12/15/2023    CO2 23 12/15/2023     12/15/2023    BUN 34 (H) 12/15/2023    CREATININE 0.75 12/15/2023     Scheduled medications  [Held by provider] atorvastatin, 80 mg, oral, Nightly  azithromycin, 500 mg, intravenous, q24h  cefTRIAXone, 1 g, intravenous, q24h  [Held by provider] docusate sodium, 100 mg, oral, BID  enoxaparin, 30 mg, subcutaneous, q24h  [Held by provider] ezetimibe, 10 mg, oral, Nightly  latanoprost, 1 drop, Both Eyes, Nightly  [Held by provider] levothyroxine, 125 mcg, oral, Daily  [Held by provider] melatonin, 3 mg, oral, Nightly  [Held by provider] pantoprazole, 40 mg, oral, Daily before breakfast  [Held by provider] predniSONE, 40 mg, oral, Daily  [Held by provider] sertraline, 50 mg, oral, Daily  tiotropium, 2 Inhalation, inhalation,  Daily      Continuous medications     PRN medications  PRN medications: ipratropium-albuteroL, lubricating eye drops, ondansetron, oxygen    Nadeem Melgar MD

## 2023-12-15 NOTE — CONSULTS
Reason For Consult  hospice    History Of Present Illness.     Past Medical History  She has a past medical history of Acute kidney failure, unspecified (CMS/HCC) (04/20/2019), Adverse effect of other specified systemic anti-infectives and antiparasitics, initial encounter (06/29/2021), Aftercare following joint replacement surgery (06/04/2018), Age-related nuclear cataract, left eye (06/04/2018), Age-related nuclear cataract, right eye (06/04/2018), Bilateral primary osteoarthritis of hip (06/04/2018), Cellulitis of left lower limb (09/30/2021), Cervical disc disorder, unspecified, unspecified cervical region (06/04/2018), Flat foot (pes planus) (acquired), unspecified foot (06/04/2018), Insomnia, unspecified (06/04/2018), Nocturnal polyuria (10/28/2020), Other amnesia (06/04/2018), Other conditions influencing health status (04/20/2019), Other specified anxiety disorders (10/23/2019), Other symptoms and signs involving the nervous system (06/28/2018), Paresthesia of skin (10/11/2020), Personal history of diseases of the blood and blood-forming organs and certain disorders involving the immune mechanism (05/18/2017), Personal history of diseases of the skin and subcutaneous tissue (03/27/2022), Personal history of other diseases of the digestive system (10/23/2019), Personal history of other diseases of the digestive system (10/23/2019), Personal history of other diseases of the digestive system (07/07/2020), Personal history of other diseases of the nervous system and sense organs (10/05/2020), Personal history of other diseases of the nervous system and sense organs (05/18/2017), Personal history of other infectious and parasitic diseases (04/20/2019), Personal history of other specified conditions (05/21/2019), Personal history of other specified conditions (10/14/2019), Personal history of pneumonia (recurrent) (05/21/2019), Personal history of transient ischemic attack (TIA), and cerebral infarction without  "residual deficits (05/04/2018), Posterior tibial tendinitis, unspecified leg (03/30/2018), Posterior tibial tendinitis, unspecified leg (06/04/2018), Primary open-angle glaucoma, right eye, moderate stage (01/31/2018), Primary osteoarthritis, unspecified hand (09/25/2019), Radiculopathy, lumbar region (02/05/2019), Strain of muscle(s) and tendon(s) of the rotator cuff of right shoulder, initial encounter (01/19/2021), Transient cerebral ischemic attack, unspecified (12/09/2020), Unilateral primary osteoarthritis, right hip (06/04/2018), Unspecified injury of head, sequela (02/26/2021), Unspecified rotator cuff tear or rupture of right shoulder, not specified as traumatic (05/18/2017), Urgency of urination (06/04/2018), Urinary tract infection, site not specified (06/04/2018), and Urinary tract infection, site not specified (06/04/2018).    Surgical History  She has a past surgical history that includes Lumbar discectomy (09/14/2013); Other surgical history (09/14/2013); Other surgical history (06/06/2017); Other surgical history (09/25/2015); Hysterectomy (09/25/2015); Other surgical history (09/23/2014); Hand surgery (09/23/2014); Colonoscopy (07/07/2015); CT angio abdomen pelvis w and or wo IV IV contrast (11/9/2018); IR angiogram aorta abdomen (4/10/2017); and IR intervention NEURO stent (4/10/2017).     Social History  She has no history on file for tobacco use, alcohol use, and drug use.    Family History  No family history on file.     Allergies  Penicillins, Ace inhibitors, Cephalexin, Cephalosporins, Donepezil, Gabapentin, Hydroxychloroquine sulfate, Mirabegron, Mirtazapine, Moxifloxacin, Sulfamethoxazole-trimethoprim, Ciprofloxacin, and Methotrexate    Review of Systems       Physical Exam       Last Recorded Vitals  Blood pressure 129/50, pulse 58, temperature 37.1 °C (98.8 °F), temperature source Temporal, resp. rate 17, height 1.549 m (5' 1\"), weight 49.3 kg (108 lb 11 oz), SpO2 94 %.    Relevant " Results       Assessment/Plan     This RN met with pt. And called daughter Annabelle on phone.  Reviewed hospice levels of care.  Daughter states that pt may be able to go skilled initially to SNF and then transition to a resident and she may want to go this route.  Pt. Is able to inform this Rn that she is having pain and appears slightly agitated trying to get out of bed.  Daughter aware that pt. Would be eligible at this time for short term GIP at hospice house.  Daughter state that she I not sure if [place] would be able to provide the level of care that her mother is now requiring.  T call to [place] and message left with no return call at this time.  Daughter request a FU call on Monday from [facility].  Medical team and [place] updated.         Elizabeth Sahni RN

## 2023-12-15 NOTE — HOSPITAL COURSE
Nena Ojeda is a 83 y.o. female being admitted to the MICU for AHRF requiring AirVO.PMHx notable for autonomic orthostatic hypotension, emphysema (chart dx of COPD w/o PFT). HFpEF, acid reflux disease, rheumatoid osteoarthritis, hypertension, dyslipidemia, , urinary incontinence, hypothyroidism, moderate severity dementia, and previous aspiration PNA.   On arrival, patient ill appearing and not interactive. History obtained from daughter Annabelle. Patient resides at long term assisted living ([facility]) and presented to the ED this evening because she felt exceedingly tired at her meal and found to have low pulse oximetry reading. Daughter reports that she visits her mother once per week. Over the past 2 weeks patient with cough and feeling tired. No report of fever, SOB or chest pain. Not aware of any issues swallowing in the past. Also reports patient with worsening dementia over the past year (MOCA 28 to 18) and increasing need for assistance (I.e. walker to wheelchair). In the MICU, patient was transitioned from AirVO to 4LNC, treated as an aspiration pneumonia with ceftriaxone/azithro and COPD exacerbation. Mental status back to baseline.  Had SLP evaluation and still with signs of aspiration, geriatrics involved for goals of care, after goals of care discussion family electing to consult hospice.  Social work/TCC involved, and time spent awaiting disposition.  Patient continue to work on optimization of respiratory status with all precautions I-S, Acapella and in respiratory therapy, overall more comfortable and better handling her cough and sputum production.  Discharged with hospice.

## 2023-12-16 LAB
BACTERIA SPEC RESP CULT: ABNORMAL
GRAM STN SPEC: ABNORMAL
GRAM STN SPEC: ABNORMAL

## 2023-12-16 PROCEDURE — 96372 THER/PROPH/DIAG INJ SC/IM: CPT | Performed by: STUDENT IN AN ORGANIZED HEALTH CARE EDUCATION/TRAINING PROGRAM

## 2023-12-16 PROCEDURE — 99233 SBSQ HOSP IP/OBS HIGH 50: CPT | Performed by: STUDENT IN AN ORGANIZED HEALTH CARE EDUCATION/TRAINING PROGRAM

## 2023-12-16 PROCEDURE — 2500000002 HC RX 250 W HCPCS SELF ADMINISTERED DRUGS (ALT 637 FOR MEDICARE OP, ALT 636 FOR OP/ED): Performed by: STUDENT IN AN ORGANIZED HEALTH CARE EDUCATION/TRAINING PROGRAM

## 2023-12-16 PROCEDURE — 2500000005 HC RX 250 GENERAL PHARMACY W/O HCPCS: Performed by: STUDENT IN AN ORGANIZED HEALTH CARE EDUCATION/TRAINING PROGRAM

## 2023-12-16 PROCEDURE — 2500000004 HC RX 250 GENERAL PHARMACY W/ HCPCS (ALT 636 FOR OP/ED): Performed by: STUDENT IN AN ORGANIZED HEALTH CARE EDUCATION/TRAINING PROGRAM

## 2023-12-16 PROCEDURE — 94640 AIRWAY INHALATION TREATMENT: CPT

## 2023-12-16 PROCEDURE — 1100000001 HC PRIVATE ROOM DAILY

## 2023-12-16 PROCEDURE — 2500000001 HC RX 250 WO HCPCS SELF ADMINISTERED DRUGS (ALT 637 FOR MEDICARE OP): Performed by: STUDENT IN AN ORGANIZED HEALTH CARE EDUCATION/TRAINING PROGRAM

## 2023-12-16 RX ORDER — ACETAMINOPHEN 325 MG/1
650 TABLET ORAL EVERY 6 HOURS PRN
Status: DISCONTINUED | OUTPATIENT
Start: 2023-12-16 | End: 2023-12-18 | Stop reason: HOSPADM

## 2023-12-16 RX ADMIN — CARBOXYMETHYLCELLULOSE SODIUM 2 DROP: 5 SOLUTION/ DROPS OPHTHALMIC at 14:13

## 2023-12-16 RX ADMIN — AZITHROMYCIN 500 MG: 500 INJECTION, POWDER, LYOPHILIZED, FOR SOLUTION INTRAVENOUS at 16:25

## 2023-12-16 RX ADMIN — CEFTRIAXONE SODIUM 1 G: 1 INJECTION, SOLUTION INTRAVENOUS at 18:17

## 2023-12-16 RX ADMIN — Medication 4 L/MIN: at 08:46

## 2023-12-16 RX ADMIN — TIOTROPIUM BROMIDE INHALATION SPRAY 2 PUFF: 3.12 SPRAY, METERED RESPIRATORY (INHALATION) at 08:44

## 2023-12-16 RX ADMIN — CARBOXYMETHYLCELLULOSE SODIUM 2 DROP: 5 SOLUTION/ DROPS OPHTHALMIC at 17:00

## 2023-12-16 RX ADMIN — ENOXAPARIN SODIUM 30 MG: 30 INJECTION SUBCUTANEOUS at 05:29

## 2023-12-16 ASSESSMENT — COGNITIVE AND FUNCTIONAL STATUS - GENERAL
CLIMB 3 TO 5 STEPS WITH RAILING: A LOT
DAILY ACTIVITIY SCORE: 12
STANDING UP FROM CHAIR USING ARMS: A LITTLE
MOBILITY SCORE: 17
MOVING TO AND FROM BED TO CHAIR: A LITTLE
MOVING FROM LYING ON BACK TO SITTING ON SIDE OF FLAT BED WITH BEDRAILS: A LITTLE
MOVING TO AND FROM BED TO CHAIR: A LITTLE
CLIMB 3 TO 5 STEPS WITH RAILING: A LOT
EATING MEALS: A LITTLE
WALKING IN HOSPITAL ROOM: A LITTLE
MOBILITY SCORE: 17
MOVING FROM LYING ON BACK TO SITTING ON SIDE OF FLAT BED WITH BEDRAILS: A LITTLE
STANDING UP FROM CHAIR USING ARMS: A LITTLE
DRESSING REGULAR UPPER BODY CLOTHING: A LOT
WALKING IN HOSPITAL ROOM: A LITTLE
DRESSING REGULAR LOWER BODY CLOTHING: A LOT
TURNING FROM BACK TO SIDE WHILE IN FLAT BAD: A LITTLE
TURNING FROM BACK TO SIDE WHILE IN FLAT BAD: A LITTLE
PERSONAL GROOMING: A LOT
HELP NEEDED FOR BATHING: A LOT
TOILETING: TOTAL

## 2023-12-16 ASSESSMENT — PAIN SCALES - GENERAL
PAINLEVEL_OUTOF10: 0 - NO PAIN
PAINLEVEL_OUTOF10: 0 - NO PAIN

## 2023-12-16 ASSESSMENT — PAIN SCALES - WONG BAKER: WONGBAKER_NUMERICALRESPONSE: NO HURT

## 2023-12-16 NOTE — PROGRESS NOTES
Nena Ojeda is a 83 y.o. female on day 3 of admission presenting with Multifocal pneumonia.    Subjective   Chart reviewed; noted that [facility] ERI Johnson met patient last evening and spoke to daughter Annabelle via phone. Please see her note for more information.    Call placed to daughter Annabelle to further discuss discharge plan; states that she would like her mom to return to [facility] with hospice services as this is the environment she is most familiar with. She is unsure if [facility] will take her back since PT/OT is recommending moderate intensity at discharge. Discussed with daughter that SW would follow up with the Monday morning to confirm they can accept, per PT notes patient continues to be x1 assist. Further, discussed that if patient were to go to SNF as NPO, they would have to keep that and patient would not be able to have pleasure feeds due to the aspiration risk. Discussed that the only way patient can have pleasure feeds at facility is with hospice. She expressed understanding.    Plan is for SW to follow up with [facility] and [facility] E[facility] Johnson to coordinate discharge.     -Helen BARRETO MA, LSW  [phone] or [facility] Chat  Care Transitions

## 2023-12-16 NOTE — PROGRESS NOTES
Assessment/Plan   Nena Ojeda is a 83 y.o. female being admitted to the MICU for AHRF requiring AirVO.PMHx notable for autonomic orthostatic hypotension, emphysema (chart dx of COPD w/o PFT). HFpEF, acid reflux disease, rheumatoid osteoarthritis, hypertension, dyslipidemia, , urinary incontinence, hypothyroidism, moderate severity dementia, and previous aspiration PNA.   On arrival, patient ill appearing and not interactive. History obtained from daughter Annabelle. Patient resides at [facility] term assisted living ([facility]) and presented to the ED this evening because she felt exceedingly tired at her meal and found to have low pulse oximetry reading. Daughter reports that she visits her mother once per week. Over the past 2 weeks patient with cough and feeling tired. No report of fever, SOB or chest pain. Not aware of any issues swallowing in the past. Also reports patient with worsening dementia over the past year (MOCA 28 to 18) and increasing need for assistance (I.e. walker to wheelchair). In the MICU, patient was transitioned from AirVO to 4LNC, treated as an aspiration pneumonia with ceftriaxone/azithro and COPD exacerbation. Mental status back to baseline.  Had SLP evaluation and still with signs of aspiration, geriatrics involved for goals of care, after goals of care discussion family electing to consult hospice.  Social work/TCC involved, considering DC to SNF when ready..    # Acute hypoxemic respiratory failure secondary to aspiration pneumonia in setting of chronic COPD  # Dysphagia  -Weaned off Airvo, now on 4 L nasal cannula.   -Continuing ceftriaxone and azithromycin  -Did not pass for diet, geriatrics consulted for goals of care, electing hospice at this time  -Frequent oral hygiene, bronchopulmonary hygiene, continue home inhalers and DuoNeb as needed.    -Continue to wean oxygen as tolerated to determine baseline needed at this time, will continue however as needed for comfort  -Okay for goals of  "care discussion to start pleasure feeds.  Currently CODE STATUS DNR/DNI but will call family to discuss and confirm CODE STATUS given they are pursuing hospice and comfort care as goal of care.    # Acute encephalopathy, metabolic  # Dementia  -Delirium precautions  -Overall improved    #HTN  #HFpEF  -Hold losartan and diuretic I/s/o of WALESKA. BNP elevated however dry on exam.   -Consider keeping patient net even daily to avoid additional culprit to hypoxemia      #Depression: Hold sertraline    #Insomnia: resume melatonin    #HA  - start tylenol.     #DLD  -Hold statin     #GERD: hold PPI     #Hypothyroidism - c/w home synthroid       Will continue goc with family this afternoon.     Scheduled outpatient appointments in system:   Future Appointments   Date Time Provider Department Center   12/20/2023  2:30 PM [facility] GERIATRICS RN 1 [id number] [facility]   12/20/2023  3:00 PM Joe Camarillo MD [id number] [facility]     ---------------------------------------------------------------------------------------------------  Subjective   No events, mentation  remains  close to her baseline.  She endorses mild shortness of breath but states it does not bother her because she is not moving.  She is unclear why she is able to start her diet today than yesterday, attempted to explain, patient had poor comprehension of prior respiratory events leading to ICU.  Reports she has a mild headache.  No other pain or other complaints at this time.  ---------------------------------------------------------------------------------------------------  Objective   Last Recorded Vitals  Blood pressure 156/58, pulse 75, temperature 37 °C (98.6 °F), resp. rate 18, height 1.549 m (5' 1\"), weight 49.3 kg (108 lb 11 oz), SpO2 90 %.  Intake/Output last 3 Shifts:  I/O last 3 completed shifts:  In: 100 (2 mL/kg) [IV Piggyback:100]  Out: - (0 mL/kg)   Dosing Weight: 49.3 kg     Physical Exam  Vitals and nursing note reviewed.   Constitutional:       " General: She is not in acute distress.     Appearance: She is ill-appearing. She is not toxic-appearing.      Comments: Elderly, frail female   HENT:      Head: Normocephalic and atraumatic.      Mouth/Throat:      Mouth: Mucous membranes are moist.   Eyes:      General: No scleral icterus.     Extraocular Movements: Extraocular movements intact.      Conjunctiva/sclera: Conjunctivae normal.   Cardiovascular:      Rate and Rhythm: Normal rate and regular rhythm.      Heart sounds: S1 normal and S2 normal. No murmur heard.  Pulmonary:      Effort: Pulmonary effort is normal. No respiratory distress.      Comments: Coarse breath sounds bilaterally but equal  Abdominal:      General: Bowel sounds are normal. There is no distension.      Palpations: Abdomen is soft.      Tenderness: There is no abdominal tenderness. There is no guarding or rebound.   Musculoskeletal:         General: No swelling or deformity.      Cervical back: Neck supple.   Skin:     General: Skin is warm and dry.      Findings: No rash.   Neurological:      Mental Status: She is alert. Mental status is at baseline.   Psychiatric:         Mood and Affect: Mood normal.         Relevant Results  Lab Results   Component Value Date    WBC 17.1 (H) 12/15/2023    HGB 9.8 (L) 12/15/2023    HCT 29.9 (L) 12/15/2023    MCV 83 12/15/2023     12/15/2023      Lab Results   Component Value Date    GLUCOSE 71 (L) 12/15/2023    CALCIUM 8.2 (L) 12/15/2023     12/15/2023    K 3.7 12/15/2023    CO2 23 12/15/2023     12/15/2023    BUN 34 (H) 12/15/2023    CREATININE 0.75 12/15/2023     Scheduled medications  [Held by provider] atorvastatin, 80 mg, oral, Nightly  azithromycin, 500 mg, intravenous, q24h  cefTRIAXone, 1 g, intravenous, q24h  [Held by provider] docusate sodium, 100 mg, oral, BID  enoxaparin, 30 mg, subcutaneous, q24h  [Held by provider] ezetimibe, 10 mg, oral, Nightly  latanoprost, 1 drop, Both Eyes, Nightly  levothyroxine, 125 mcg, oral,  Daily  lubricating eye drops, 2 drop, Both Eyes, 4x daily  melatonin, 3 mg, oral, Nightly  [Held by provider] pantoprazole, 40 mg, oral, Daily before breakfast  [Held by provider] predniSONE, 40 mg, oral, Daily  [Held by provider] sertraline, 50 mg, oral, Daily  tiotropium, 2 Inhalation, inhalation, Daily      Continuous medications     PRN medications  PRN medications: ipratropium-albuteroL, ondansetron, oxygen    Nadeem Melgar MD

## 2023-12-16 NOTE — CARE PLAN
Problem: Skin  Goal: Participates in plan/prevention/treatment measures  Outcome: Progressing  Goal: Prevent/manage excess moisture  Outcome: Progressing  Goal: Prevent/minimize sheer/friction injuries  Outcome: Progressing  Goal: Promote/optimize nutrition  Outcome: Progressing  Goal: Promote skin healing  Outcome: Progressing  Flowsheets (Taken 12/16/2023 0401)  Promote skin healing: Turn/reposition every 2 hours/use positioning/transfer devices   The patient's goals for the shift include      The clinical goals for the shift include Pt will remain safe during this shift

## 2023-12-17 PROCEDURE — 2500000004 HC RX 250 GENERAL PHARMACY W/ HCPCS (ALT 636 FOR OP/ED): Performed by: STUDENT IN AN ORGANIZED HEALTH CARE EDUCATION/TRAINING PROGRAM

## 2023-12-17 PROCEDURE — 94667 MNPJ CHEST WALL 1ST: CPT

## 2023-12-17 PROCEDURE — 99232 SBSQ HOSP IP/OBS MODERATE 35: CPT | Performed by: STUDENT IN AN ORGANIZED HEALTH CARE EDUCATION/TRAINING PROGRAM

## 2023-12-17 PROCEDURE — 96372 THER/PROPH/DIAG INJ SC/IM: CPT | Performed by: STUDENT IN AN ORGANIZED HEALTH CARE EDUCATION/TRAINING PROGRAM

## 2023-12-17 PROCEDURE — 2500000001 HC RX 250 WO HCPCS SELF ADMINISTERED DRUGS (ALT 637 FOR MEDICARE OP): Performed by: STUDENT IN AN ORGANIZED HEALTH CARE EDUCATION/TRAINING PROGRAM

## 2023-12-17 PROCEDURE — 1100000001 HC PRIVATE ROOM DAILY

## 2023-12-17 RX ADMIN — CARBOXYMETHYLCELLULOSE SODIUM 2 DROP: 5 SOLUTION/ DROPS OPHTHALMIC at 20:19

## 2023-12-17 RX ADMIN — ENOXAPARIN SODIUM 30 MG: 30 INJECTION SUBCUTANEOUS at 05:11

## 2023-12-17 RX ADMIN — CARBOXYMETHYLCELLULOSE SODIUM 2 DROP: 5 SOLUTION/ DROPS OPHTHALMIC at 14:52

## 2023-12-17 RX ADMIN — MELATONIN 3 MG: 3 TAB ORAL at 20:19

## 2023-12-17 RX ADMIN — LEVOTHYROXINE SODIUM 125 MCG: 100 TABLET ORAL at 05:11

## 2023-12-17 RX ADMIN — CEFTRIAXONE SODIUM 1 G: 1 INJECTION, SOLUTION INTRAVENOUS at 17:02

## 2023-12-17 RX ADMIN — CARBOXYMETHYLCELLULOSE SODIUM 2 DROP: 5 SOLUTION/ DROPS OPHTHALMIC at 17:02

## 2023-12-17 ASSESSMENT — COGNITIVE AND FUNCTIONAL STATUS - GENERAL
DRESSING REGULAR UPPER BODY CLOTHING: A LOT
TOILETING: TOTAL
MOBILITY SCORE: 17
MOVING FROM LYING ON BACK TO SITTING ON SIDE OF FLAT BED WITH BEDRAILS: A LITTLE
DRESSING REGULAR LOWER BODY CLOTHING: A LOT
DAILY ACTIVITIY SCORE: 12
STANDING UP FROM CHAIR USING ARMS: A LITTLE
DAILY ACTIVITIY SCORE: 12
PERSONAL GROOMING: A LOT
WALKING IN HOSPITAL ROOM: A LITTLE
CLIMB 3 TO 5 STEPS WITH RAILING: A LOT
EATING MEALS: A LITTLE
CLIMB 3 TO 5 STEPS WITH RAILING: A LOT
MOVING FROM LYING ON BACK TO SITTING ON SIDE OF FLAT BED WITH BEDRAILS: A LITTLE
STANDING UP FROM CHAIR USING ARMS: A LITTLE
WALKING IN HOSPITAL ROOM: A LITTLE
TURNING FROM BACK TO SIDE WHILE IN FLAT BAD: A LITTLE
HELP NEEDED FOR BATHING: A LOT
DRESSING REGULAR LOWER BODY CLOTHING: A LOT
HELP NEEDED FOR BATHING: A LOT
TURNING FROM BACK TO SIDE WHILE IN FLAT BAD: A LITTLE
PERSONAL GROOMING: A LOT
TOILETING: TOTAL
MOVING TO AND FROM BED TO CHAIR: A LITTLE
MOVING TO AND FROM BED TO CHAIR: A LITTLE
DRESSING REGULAR UPPER BODY CLOTHING: A LOT
MOBILITY SCORE: 17
EATING MEALS: A LITTLE

## 2023-12-17 ASSESSMENT — PAIN - FUNCTIONAL ASSESSMENT: PAIN_FUNCTIONAL_ASSESSMENT: 0-10

## 2023-12-17 ASSESSMENT — PAIN SCALES - GENERAL
PAINLEVEL_OUTOF10: 0 - NO PAIN
PAINLEVEL_OUTOF10: 0 - NO PAIN

## 2023-12-17 NOTE — PROGRESS NOTES
Assessment/Plan   Nena Ojeda is a 83 y.o. female being admitted to the MICU for AHRF requiring AirVO.PMHx notable for autonomic orthostatic hypotension, emphysema (chart dx of COPD w/o PFT). HFpEF, acid reflux disease, rheumatoid osteoarthritis, hypertension, dyslipidemia, , urinary incontinence, hypothyroidism, moderate severity dementia, and previous aspiration PNA.   On arrival, patient ill appearing and not interactive. History obtained from daughter Annabelle. Patient resides at [facility] term assisted living ([facility]) and presented to the ED this evening because she felt exceedingly tired at her meal and found to have low pulse oximetry reading. Daughter reports that she visits her mother once per week. Over the past 2 weeks patient with cough and feeling tired. No report of fever, SOB or chest pain. Not aware of any issues swallowing in the past. Also reports patient with worsening dementia over the past year (MOCA 28 to 18) and increasing need for assistance (I.e. walker to wheelchair). In the MICU, patient was transitioned from AirVO to 4LNC, treated as an aspiration pneumonia with ceftriaxone/azithro and COPD exacerbation. Mental status back to baseline.  Had SLP evaluation and still with signs of aspiration, geriatrics involved for goals of care, after goals of care discussion family electing to consult hospice.  Social work/TCC involved, considering DC to SNF when ready.    # Acute hypoxemic respiratory failure secondary to aspiration pneumonia in setting of chronic COPD  # Dysphagia  -Weaned off Airvo, now on 4 L nasal cannula.   -Sputum cultures growing MSSA  -Continuing ceftriaxone for 5 days, completed azithromycin  -Did not pass for diet, geriatrics consulted for goals of care, electing hospice at this time, ok to continue   -Frequent oral hygiene, bronchopulmonary hygiene, continue home inhalers and DuoNeb as needed.    -Continue to wean oxygen as tolerated to determine baseline needed at this time,  "will continue however as needed for comfort  -Okay for goals of care discussion to start pleasure feeds.  Currently CODE STATUS DNR/DNI but will call family to discuss and confirm CODE STATUS given they are pursuing hospice and comfort care as goal of care. At this time per flora and tcc/sw awaiting to sign hospice. Plan to DC to assisted living facility under [facility].  Facility does not have admitting for the weekend.    # Acute encephalopathy, metabolic  # Dementia  -Delirium precautions  -Overall improved    #HTN  #HFpEF  -Hold losartan and diuretic I/s/o of WALESKA. BNP elevated however dry on exam.   -Consider keeping patient net even daily to avoid additional culprit to hypoxemia      #Depression: Hold sertraline    #Insomnia: resume melatonin    #HA  - start tylenol.     #DLD  -Hold statin     #GERD: hold PPI     #Hypothyroidism - c/w home synthroid     Pending hospice arrangements at assisted living facility    Scheduled outpatient appointments in system:   Future Appointments   Date Time Provider Department Center   12/20/2023  2:30 PM [facility] GERIATRICS RN 1 [id number] [facility]   12/20/2023  3:00 PM Joe Camarillo MD [id number] [facility]     ---------------------------------------------------------------------------------------------------  Subjective   No events, mentation  remains  close to her baseline.  She endorses mild shortness of breath but states it does not bother her because she is not moving.  She is unclear why she is able to start her diet today than yesterday, attempted to explain, patient had poor comprehension of prior respiratory events leading to ICU.  Reports she has a mild headache.  No other pain or other complaints at this time.  ---------------------------------------------------------------------------------------------------  Objective   Last Recorded Vitals  Blood pressure 147/78, pulse 104, temperature 36 °C (96.8 °F), resp. rate 16, height 1.549 m (5' 1\"), weight 49.3 kg (108 lb 11 oz), " SpO2 96 %.  Intake/Output last 3 Shifts:  I/O last 3 completed shifts:  In: 300 (6.1 mL/kg) [IV Piggyback:300]  Out: - (0 mL/kg)   Dosing Weight: 49.3 kg     Physical Exam  Vitals and nursing note reviewed.   Constitutional:       General: She is not in acute distress.     Appearance: She is ill-appearing. She is not toxic-appearing.      Comments: Elderly, frail female   HENT:      Head: Normocephalic and atraumatic.      Mouth/Throat:      Mouth: Mucous membranes are moist.   Eyes:      General: No scleral icterus.     Extraocular Movements: Extraocular movements intact.      Conjunctiva/sclera: Conjunctivae normal.   Cardiovascular:      Rate and Rhythm: Normal rate and regular rhythm.      Heart sounds: S1 normal and S2 normal. No murmur heard.  Pulmonary:      Effort: Pulmonary effort is normal. No respiratory distress.      Comments: Coarse breath sounds bilaterally but equal  Abdominal:      General: Bowel sounds are normal. There is no distension.      Palpations: Abdomen is soft.      Tenderness: There is no abdominal tenderness. There is no guarding or rebound.   Musculoskeletal:         General: No swelling or deformity.      Cervical back: Neck supple.   Skin:     General: Skin is warm and dry.      Findings: No rash.   Neurological:      Mental Status: She is alert. Mental status is at baseline.   Psychiatric:         Mood and Affect: Mood normal.         Relevant Results  Lab Results   Component Value Date    WBC 17.1 (H) 12/15/2023    HGB 9.8 (L) 12/15/2023    HCT 29.9 (L) 12/15/2023    MCV 83 12/15/2023     12/15/2023      Lab Results   Component Value Date    GLUCOSE 71 (L) 12/15/2023    CALCIUM 8.2 (L) 12/15/2023     12/15/2023    K 3.7 12/15/2023    CO2 23 12/15/2023     12/15/2023    BUN 34 (H) 12/15/2023    CREATININE 0.75 12/15/2023     Scheduled medications  [Held by provider] atorvastatin, 80 mg, oral, Nightly  cefTRIAXone, 1 g, intravenous, q24h  [Held by provider]  docusate sodium, 100 mg, oral, BID  enoxaparin, 30 mg, subcutaneous, q24h  [Held by provider] ezetimibe, 10 mg, oral, Nightly  latanoprost, 1 drop, Both Eyes, Nightly  levothyroxine, 125 mcg, oral, Daily  lubricating eye drops, 2 drop, Both Eyes, 4x daily  melatonin, 3 mg, oral, Nightly  [Held by provider] pantoprazole, 40 mg, oral, Daily before breakfast  [Held by provider] predniSONE, 40 mg, oral, Daily  [Held by provider] sertraline, 50 mg, oral, Daily  tiotropium, 2 Inhalation, inhalation, Daily      Continuous medications     PRN medications  PRN medications: acetaminophen, ipratropium-albuteroL, ondansetron, oxygen    Nadeem Melgar MD

## 2023-12-18 VITALS
HEIGHT: 61 IN | TEMPERATURE: 96.8 F | DIASTOLIC BLOOD PRESSURE: 67 MMHG | SYSTOLIC BLOOD PRESSURE: 133 MMHG | OXYGEN SATURATION: 95 % | HEART RATE: 73 BPM | BODY MASS INDEX: 20.52 KG/M2 | WEIGHT: 108.69 LBS | RESPIRATION RATE: 16 BRPM

## 2023-12-18 PROBLEM — G47.33 OSA (OBSTRUCTIVE SLEEP APNEA): Status: ACTIVE | Noted: 2018-07-31
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
## 2023-12-18 PROBLEM — D18.01 HEMANGIOMA OF SKIN AND SUBCUTANEOUS TISSUE: Status: ACTIVE | Noted: 2020-07-27

## 2023-12-18 PROBLEM — D50.9 IRON DEFICIENCY ANEMIA: Status: ACTIVE | Noted: 2023-12-18
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
## 2023-12-18 PROBLEM — J84.9 INTERSTITIAL LUNG DISEASE (MULTI): Status: ACTIVE | Noted: 2023-12-18

## 2023-12-18 PROBLEM — K57.30 DIVERTICULOSIS OF COLON: Status: ACTIVE | Noted: 2023-12-18

## 2023-12-18 PROBLEM — M17.12 PRIMARY OSTEOARTHRITIS OF LEFT KNEE: Status: ACTIVE | Noted: 2023-12-18

## 2023-12-18 PROBLEM — R25.2 MUSCLE CRAMPS: Status: ACTIVE | Noted: 2023-12-18
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
## 2023-12-18 PROBLEM — F32.A DEPRESSION: Status: ACTIVE | Noted: 2022-10-18

## 2023-12-18 PROBLEM — S52.90XA FRACTURE OF RADIUS: Status: ACTIVE | Noted: 2023-12-18

## 2023-12-18 PROBLEM — E87.6 HYPOKALEMIA: Status: ACTIVE | Noted: 2023-12-18

## 2023-12-18 PROBLEM — G56.11: Status: ACTIVE | Noted: 2023-12-18

## 2023-12-18 PROBLEM — M11.262 PSEUDOGOUT OF LEFT KNEE: Status: ACTIVE | Noted: 2023-12-18

## 2023-12-18 PROBLEM — L82.1 OTHER SEBORRHEIC KERATOSIS: Status: ACTIVE | Noted: 2020-07-27

## 2023-12-18 PROBLEM — K21.9 GASTRO-ESOPHAGEAL REFLUX DISEASE WITHOUT ESOPHAGITIS: Status: ACTIVE | Noted: 2022-10-18

## 2023-12-18 PROBLEM — Z85.828 PERSONAL HISTORY OF OTHER MALIGNANT NEOPLASM OF SKIN: Status: ACTIVE | Noted: 2020-07-27

## 2023-12-18 PROBLEM — E03.9 HYPOTHYROIDISM: Status: ACTIVE | Noted: 2021-11-01

## 2023-12-18 PROBLEM — R41.3 MEMORY LOSS: Status: ACTIVE | Noted: 2023-12-18

## 2023-12-18 PROBLEM — I89.0 LYMPHEDEMA: Status: ACTIVE | Noted: 2023-12-18

## 2023-12-18 PROBLEM — G47.19 EXCESSIVE DAYTIME SLEEPINESS: Status: ACTIVE | Noted: 2023-12-18

## 2023-12-18 PROBLEM — I49.3 PVC (PREMATURE VENTRICULAR CONTRACTION): Status: ACTIVE | Noted: 2023-12-18

## 2023-12-18 PROBLEM — K21.9 GERD (GASTROESOPHAGEAL REFLUX DISEASE): Status: ACTIVE | Noted: 2023-12-18

## 2023-12-18 PROBLEM — M17.11 OSTEOARTHRITIS OF RIGHT KNEE: Status: ACTIVE | Noted: 2023-12-18

## 2023-12-18 PROBLEM — J44.9 CHRONIC OBSTRUCTIVE PULMONARY DISEASE (MULTI): Status: ACTIVE | Noted: 2022-10-18

## 2023-12-18 PROBLEM — L57.9 SKIN CHANGES DUE TO CHRONIC EXPOSURE TO NONIONIZING RADIATION, UNSPECIFIED: Status: ACTIVE | Noted: 2020-07-27

## 2023-12-18 PROBLEM — Z96.1 PSEUDOPHAKIA OF BOTH EYES: Status: ACTIVE | Noted: 2023-12-18

## 2023-12-18 PROBLEM — H43.812 PVD (POSTERIOR VITREOUS DETACHMENT), LEFT EYE: Status: ACTIVE | Noted: 2023-12-18

## 2023-12-18 PROBLEM — G56.10: Status: ACTIVE | Noted: 2023-12-18

## 2023-12-18 PROBLEM — Z98.890 HISTORY OF SPINAL SURGERY: Status: ACTIVE | Noted: 2017-08-21

## 2023-12-18 PROBLEM — M10.9 GOUT: Status: ACTIVE | Noted: 2023-12-18

## 2023-12-18 PROBLEM — I50.30 DIASTOLIC HEART FAILURE (MULTI): Status: ACTIVE | Noted: 2023-12-18

## 2023-12-18 PROBLEM — H02.883 MEIBOMIAN GLAND DYSFUNCTION (MGD) OF RIGHT EYE: Status: ACTIVE | Noted: 2023-12-18

## 2023-12-18 PROBLEM — G47.00 INSOMNIA: Status: ACTIVE | Noted: 2023-12-18

## 2023-12-18 PROBLEM — I10 ESSENTIAL HYPERTENSION: Status: ACTIVE | Noted: 2022-10-18

## 2023-12-18 PROBLEM — I73.9 PERIPHERAL VASCULAR DISEASE (CMS-HCC): Status: ACTIVE | Noted: 2023-12-18

## 2023-12-18 PROBLEM — J18.9 MULTIFOCAL PNEUMONIA: Status: RESOLVED | Noted: 2023-12-13 | Resolved: 2023-12-18

## 2023-12-18 PROBLEM — L57.0 ACTINIC KERATOSIS: Status: ACTIVE | Noted: 2020-07-27

## 2023-12-18 PROBLEM — N32.81 OVERACTIVE BLADDER: Status: ACTIVE | Noted: 2023-12-18

## 2023-12-18 PROBLEM — M19.90 OSTEOARTHRITIS: Status: ACTIVE | Noted: 2022-10-18

## 2023-12-18 PROBLEM — F33.41 RECURRENT MAJOR DEPRESSIVE DISORDER, IN PARTIAL REMISSION (CMS-HCC): Status: ACTIVE | Noted: 2023-12-18

## 2023-12-18 PROBLEM — I27.20 PULMONARY HTN (MULTI): Status: ACTIVE | Noted: 2023-12-18

## 2023-12-18 PROBLEM — E78.5 DYSLIPIDEMIA: Status: ACTIVE | Noted: 2023-12-18

## 2023-12-18 PROBLEM — M81.0 OSTEOPOROSIS: Status: ACTIVE | Noted: 2023-12-18

## 2023-12-18 PROBLEM — I10 HYPERTENSION: Status: ACTIVE | Noted: 2023-12-18

## 2023-12-18 PROBLEM — E78.5 HYPERLIPIDEMIA: Status: ACTIVE | Noted: 2021-11-01

## 2023-12-18 PROBLEM — N95.9 POSTMENOPAUSAL SYMPTOMS: Status: ACTIVE | Noted: 2023-12-18

## 2023-12-18 PROBLEM — M17.0 PRIMARY OSTEOARTHRITIS OF BOTH KNEES: Status: ACTIVE | Noted: 2017-08-21

## 2023-12-18 PROBLEM — D22.5 MELANOCYTIC NEVI OF TRUNK: Status: ACTIVE | Noted: 2020-07-27

## 2023-12-18 PROBLEM — I49.3 FREQUENT PVCS: Status: ACTIVE | Noted: 2023-12-18

## 2023-12-18 PROBLEM — M25.362 PATELLAR INSTABILITY OF LEFT KNEE: Status: ACTIVE | Noted: 2017-08-21

## 2023-12-18 PROCEDURE — 2500000004 HC RX 250 GENERAL PHARMACY W/ HCPCS (ALT 636 FOR OP/ED): Performed by: STUDENT IN AN ORGANIZED HEALTH CARE EDUCATION/TRAINING PROGRAM

## 2023-12-18 PROCEDURE — 2500000001 HC RX 250 WO HCPCS SELF ADMINISTERED DRUGS (ALT 637 FOR MEDICARE OP): Performed by: STUDENT IN AN ORGANIZED HEALTH CARE EDUCATION/TRAINING PROGRAM

## 2023-12-18 PROCEDURE — 99239 HOSP IP/OBS DSCHRG MGMT >30: CPT | Performed by: STUDENT IN AN ORGANIZED HEALTH CARE EDUCATION/TRAINING PROGRAM

## 2023-12-18 PROCEDURE — 96372 THER/PROPH/DIAG INJ SC/IM: CPT | Performed by: STUDENT IN AN ORGANIZED HEALTH CARE EDUCATION/TRAINING PROGRAM

## 2023-12-18 PROCEDURE — 99232 SBSQ HOSP IP/OBS MODERATE 35: CPT

## 2023-12-18 RX ORDER — ACETAMINOPHEN 325 MG/1
TABLET ORAL
COMMUNITY
Start: 2022-10-18 | End: 2023-12-18 | Stop reason: HOSPADM

## 2023-12-18 RX ORDER — AMLODIPINE BESYLATE 5 MG/1
TABLET ORAL
COMMUNITY
Start: 2023-09-28 | End: 2023-12-18 | Stop reason: HOSPADM

## 2023-12-18 RX ORDER — CALCIUM/MAGNESIUM/ZINC 333-133-5
TABLET ORAL
COMMUNITY
Start: 2022-03-04 | End: 2023-12-18 | Stop reason: HOSPADM

## 2023-12-18 RX ORDER — IPRATROPIUM BROMIDE AND ALBUTEROL SULFATE 2.5; .5 MG/3ML; MG/3ML
3 SOLUTION RESPIRATORY (INHALATION) EVERY 4 HOURS PRN
Qty: 180 ML | Refills: 11 | Status: SHIPPED | OUTPATIENT
Start: 2023-12-18

## 2023-12-18 RX ORDER — OXYCODONE HYDROCHLORIDE 5 MG/1
2.5 TABLET ORAL EVERY 4 HOURS PRN
Qty: 9 TABLET | Refills: 0 | Status: SHIPPED | OUTPATIENT
Start: 2023-12-18 | End: 2023-12-21

## 2023-12-18 RX ORDER — FLUTICASONE PROPIONATE AND SALMETEROL 500; 50 UG/1; UG/1
POWDER RESPIRATORY (INHALATION)
COMMUNITY
Start: 2022-10-18

## 2023-12-18 RX ORDER — ATORVASTATIN CALCIUM 80 MG/1
80 TABLET, FILM COATED ORAL
COMMUNITY
End: 2023-12-18 | Stop reason: HOSPADM

## 2023-12-18 RX ORDER — ASPIRIN 81 MG
TABLET, DELAYED RELEASE (ENTERIC COATED) ORAL
COMMUNITY
Start: 2022-10-18 | End: 2023-12-18 | Stop reason: HOSPADM

## 2023-12-18 RX ORDER — BIMATOPROST 0.1 MG/ML
SOLUTION/ DROPS OPHTHALMIC
COMMUNITY
Start: 2020-02-20

## 2023-12-18 RX ORDER — AMOXICILLIN 250 MG
2 CAPSULE ORAL NIGHTLY
Start: 2023-12-18

## 2023-12-18 RX ORDER — BUPROPION HYDROCHLORIDE 150 MG/1
150 TABLET, EXTENDED RELEASE ORAL 2 TIMES DAILY
COMMUNITY
Start: 2014-12-23

## 2023-12-18 RX ORDER — CELECOXIB 200 MG/1
CAPSULE ORAL
COMMUNITY
Start: 2022-10-18

## 2023-12-18 RX ORDER — ACETAMINOPHEN 325 MG/1
650 TABLET ORAL EVERY 8 HOURS
Qty: 30 TABLET | Refills: 0 | Status: SHIPPED | OUTPATIENT
Start: 2023-12-18

## 2023-12-18 RX ORDER — LORAZEPAM 0.5 MG/1
0.5 TABLET ORAL EVERY 6 HOURS PRN
Qty: 6 TABLET | Refills: 0 | Status: SHIPPED | OUTPATIENT
Start: 2023-12-18 | End: 2023-12-21

## 2023-12-18 RX ORDER — ACETAMINOPHEN AND CODEINE PHOSPHATE 300; 30 MG/1; MG/1
TABLET ORAL
COMMUNITY
End: 2023-12-18 | Stop reason: HOSPADM

## 2023-12-18 RX ADMIN — LATANOPROST 1 DROP: 50 SOLUTION/ DROPS OPHTHALMIC at 20:22

## 2023-12-18 RX ADMIN — LEVOTHYROXINE SODIUM 125 MCG: 100 TABLET ORAL at 05:37

## 2023-12-18 RX ADMIN — ACETAMINOPHEN 650 MG: 325 TABLET ORAL at 09:47

## 2023-12-18 RX ADMIN — CARBOXYMETHYLCELLULOSE SODIUM 2 DROP: 5 SOLUTION/ DROPS OPHTHALMIC at 17:59

## 2023-12-18 RX ADMIN — CARBOXYMETHYLCELLULOSE SODIUM 2 DROP: 5 SOLUTION/ DROPS OPHTHALMIC at 08:33

## 2023-12-18 RX ADMIN — CARBOXYMETHYLCELLULOSE SODIUM 2 DROP: 5 SOLUTION/ DROPS OPHTHALMIC at 14:34

## 2023-12-18 RX ADMIN — ACETAMINOPHEN 650 MG: 325 TABLET ORAL at 17:59

## 2023-12-18 RX ADMIN — CEFTRIAXONE SODIUM 1 G: 1 INJECTION, SOLUTION INTRAVENOUS at 16:49

## 2023-12-18 RX ADMIN — ENOXAPARIN SODIUM 30 MG: 30 INJECTION SUBCUTANEOUS at 05:37

## 2023-12-18 RX ADMIN — MELATONIN 3 MG: 3 TAB ORAL at 20:22

## 2023-12-18 RX ADMIN — CARBOXYMETHYLCELLULOSE SODIUM 2 DROP: 5 SOLUTION/ DROPS OPHTHALMIC at 20:21

## 2023-12-18 ASSESSMENT — PAIN SCALES - GENERAL
PAINLEVEL_OUTOF10: 0 - NO PAIN
PAINLEVEL_OUTOF10: 4
PAINLEVEL_OUTOF10: 0 - NO PAIN

## 2023-12-18 ASSESSMENT — COGNITIVE AND FUNCTIONAL STATUS - GENERAL
STANDING UP FROM CHAIR USING ARMS: A LOT
MOBILITY SCORE: 12
CLIMB 3 TO 5 STEPS WITH RAILING: TOTAL
MOVING FROM LYING ON BACK TO SITTING ON SIDE OF FLAT BED WITH BEDRAILS: A LITTLE
TURNING FROM BACK TO SIDE WHILE IN FLAT BAD: A LITTLE
WALKING IN HOSPITAL ROOM: TOTAL
MOVING TO AND FROM BED TO CHAIR: A LOT

## 2023-12-18 ASSESSMENT — PAIN - FUNCTIONAL ASSESSMENT: PAIN_FUNCTIONAL_ASSESSMENT: 0-10

## 2023-12-18 ASSESSMENT — PAIN DESCRIPTION - LOCATION: LOCATION: HEAD

## 2023-12-18 NOTE — PROGRESS NOTES
Nena Ojeda is a 83 y.o. female on day 5 of admission presenting with Multifocal pneumonia.    Subjective   Call placed to [facility] to inquire if they can accept patient back with hospice services. Spoke with RN who states that she will need to review with their DON.    [facility] RN Elizabeth updated.    UPDATE 1250 Received return call from Jack at [facility]; states that she spoke with daughter Annabelle and she is agreeable for patient to return to [facility] with hospice services. She requests that clinical information be emailed to her at [email or web address].     Call placed to daughter Annabelle to review; confirms that she still wants her mother to return to [facility] with hospice services. She is agreeable for [facility] RN to come out this afternoon to get working on patient discharge.    Message sent to Elizabeth RN with [facility]; states that she will come out this afternoon to work on patients discharge.    -Helen BARRETO MA, LSW  [phone] or [facility]  Care Transitions

## 2023-12-18 NOTE — PROGRESS NOTES
Subjective   No acute events overnight. Patient states that she feels well overall and in good spirits regarding the new birth of her great grandson. Reports that she has some new sores in her mouth which she noticed and it has been bothering her while eating and mildly painful. Reports that her work of breathing seems to be improving. Denies any fevers, chills, SOB, worsening pain.        Objective     Current Facility-Administered Medications   Medication Dose Route Frequency Provider Last Rate Last Admin    acetaminophen (Tylenol) tablet 650 mg  650 mg oral q6h PRN Nadeem Melgar MD   650 mg at 12/18/23 0947    [Held by provider] atorvastatin (Lipitor) tablet 80 mg  80 mg oral Nightly Nadeem Melgar MD        cefTRIAXone (Rocephin) IVPB 1 g  1 g intravenous q24h Nadeem Melgar MD   Stopped at 12/17/23 1732    [Held by provider] docusate sodium (Colace) capsule 100 mg  100 mg oral BID Nadeem Melgar MD   100 mg at 12/14/23 1018    enoxaparin (Lovenox) syringe 30 mg  30 mg subcutaneous q24h Nadeem Melgar MD   30 mg at 12/18/23 0537    [Held by provider] ezetimibe (Zetia) tablet 10 mg  10 mg oral Nightly Nadeem Melgar MD        ipratropium-albuteroL (Duo-Neb) 0.5-2.5 mg/3 mL nebulizer solution 3 mL  3 mL nebulization q6h PRN Nadeem Melgar MD        latanoprost (Xalatan) 0.005 % ophthalmic solution 1 drop  1 drop Both Eyes Nightly Nadeem Melgar MD        levothyroxine (Synthroid, Levoxyl) tablet 125 mcg  125 mcg oral Daily Nadeem Melgar MD   125 mcg at 12/18/23 0537    lubricating eye drops ophthalmic solution 2 drop  2 drop Both Eyes 4x daily Nadeem Melgar MD   2 drop at 12/18/23 0833    melatonin tablet 3 mg  3 mg oral Nightly Nadeem Melgar MD   3 mg at 12/17/23 2019    ondansetron (Zofran) injection 4 mg  4 mg intravenous Once PRN Nadeem Melgar MD        oxygen (O2) therapy   inhalation Continuous PRN - O2/gases Nadeem Melgar MD   4 L/min at  12/16/23 0846    [Held by provider] pantoprazole (ProtoNix) EC tablet 40 mg  40 mg oral Daily before breakfast Nadeem Melgar MD   40 mg at 12/14/23 1018    [Held by provider] sertraline (Zoloft) tablet 50 mg  50 mg oral Daily Nadeem Melgar MD   50 mg at 12/14/23 1018    tiotropium (Spiriva Respimat) 2.5 mcg/actuation inhaler 2 puff  2 Inhalation inhalation Daily Nadeem Melgar MD   2 puff at 12/16/23 0844       Physical Exam  Constitutional:       Appearance: She is ill-appearing.      Comments: Frail, thin   HENT:      Head: Normocephalic.   Eyes:      Extraocular Movements: Extraocular movements intact.      Conjunctiva/sclera: Conjunctivae normal.   Cardiovascular:      Rate and Rhythm: Normal rate and regular rhythm.   Pulmonary:      Effort: Pulmonary effort is normal.   Abdominal:      General: Abdomen is flat. Bowel sounds are normal.      Palpations: Abdomen is soft.   Musculoskeletal:      Cervical back: Normal range of motion.   Skin:     General: Skin is warm.   Neurological:      Mental Status: She is alert.      Comments: Aox2 (name, location)        Confusion Assessment Method(CAM) for diagnosis of delirium:    1.  Acute onset or fluctuating course: absent/present: Absent  2.  Inattention: absent/present: Absent  3.  Disorganized thinking: absent/present: Absent  4.  Altered level of consciousness: absent/present: Absent  CAM: negative    AT Score For Assessment of Delirium and Cognitive Impairment:    Alertness: 0  Normal(fully alert,but not agitated, throughout assessment)=0  Mild sleepiness for <10 seconds after walking, then normal=0  Clearly abnormal=4  2.  AMT4: 1  No mistakes=0  One mistake=1  Two or more mistakes/untestable=2  3.  Attention: 0  Achieves seven months or more correctly=0  Starts but scores <7 months/ refuses to start=1  Untestable(cannot start because unwell, drowsy, inattentive)=2  4.  Acute: 0  No=0  Yes=4    Total Score: 1  4 or above: Possible delirium +/-  cognitive impairment  1-3: Possible cognitive impairment  0: Delirium or severe cognitive impairment unlikely(but delirium still possible if (4) information incomplete)      Last Recorded Vitals      12/16/2023     8:52 PM 12/17/2023     5:00 AM 12/17/2023     5:55 AM 12/17/2023    12:48 PM 12/17/2023     8:57 PM 12/18/2023     4:35 AM 12/18/2023     5:35 AM   Vitals   Systolic 167 147  145 120 155    Diastolic 79 78  75 68 81    Heart Rate 122 104  78 80 71    Temp 37.3 °C (99.1 °F) 36 °C (96.8 °F)  37.1 °C (98.7 °F) 37.4 °C (99.3 °F) 36.9 °C (98.4 °F)    Resp 16 16  18      Weight (lb)   108.69    108.69   BMI   20.54 kg/m2    20.54 kg/m2   BSA (m2)   1.46 m2    1.46 m2      Vitals:    12/18/23 0535   Weight: 49.3 kg (108 lb 11 oz)        Relevant Results  TSH   Date Value Ref Range Status   09/16/2022 18.83 (H) 0.44 - 3.98 mIU/L Final     Comment:      TSH testing is performed using different testing    methodology at [facility] than at other    system hospitals. Direct result comparisons should    only be made within the same method.     07/08/2022 6.05 (H) 0.44 - 3.98 mIU/L Final     Comment:      TSH testing is performed using different testing    methodology at [facility] than at other    system hospitals. Direct result comparisons should    only be made within the same method.     04/12/2022 0.73 0.44 - 3.98 mIU/L Final     Comment:      TSH testing is performed using different testing    methodology at [facility] than at other    [facility]. Direct result comparisons should    only be made within the same method.       Vitamin B-12   Date Value Ref Range Status   04/12/2022 561 211 - 911 pg/mL Final   07/21/2021 554 211 - 911 pg/mL Final   10/06/2020 745 211 - 911 pg/mL Final     Vitamin D, 25-Hydroxy   Date Value Ref Range Status   07/08/2022 32 ng/mL Final     Comment:     .  DEFICIENCY:         < 20   NG/ML  INSUFFICIENCY:      20-29  NG/ML  SUFFICIENCY:          NG/ML    THIS ASSAY ACCURATELY QUANTIFIES THE SUM OF  VITAMIN D3, 25-HYDROXY AND VIT D2,25-HYDROXY.     01/07/2021 56 ng/mL Final     Comment:     .  DEFICIENCY:         < 20   NG/ML  INSUFFICIENCY:      20-29  NG/ML  SUFFICIENCY:         NG/ML    THIS ASSAY ACCURATELY QUANTIFIES THE SUM OF  VITAMIN D3, 25-HYDROXY AND VIT D2,25-HYDROXY.     10/06/2020 75 ng/mL Final     Comment:     .  DEFICIENCY:         < 20   NG/ML  INSUFFICIENCY:      20-29  NG/ML  SUFFICIENCY:         NG/ML    THIS ASSAY ACCURATELY QUANTIFIES THE SUM OF  VITAMIN D3, 25-HYDROXY AND VIT D2,25-HYDROXY.       Hemoglobin A1C   Date Value Ref Range Status   07/08/2022 5.9 (A) % Final     Comment:          Diagnosis of Diabetes-Adults   Non-Diabetic: < or = 5.6%   Increased risk for developing diabetes: 5.7-6.4%   Diagnostic of diabetes: > or = 6.5%  .       Monitoring of Diabetes                Age (y)     Therapeutic Goal (%)   Adults:          >18           <7.0   Pediatrics:    13-18           <7.5                   7-12           <8.0                   0- 6            7.5-8.5   American Diabetes Association. Diabetes Care 33(S1), Jan 2010.     10/06/2020 5.4 % Final     Comment:          Diagnosis of Diabetes-Adults   Non-Diabetic: < or = 5.6%   Increased risk for developing diabetes: 5.7-6.4%   Diagnostic of diabetes: > or = 6.5%  .       Monitoring of Diabetes                Age (y)     Therapeutic Goal (%)   Adults:          >18           <7.0   Pediatrics:    13-18           <7.5                   7-12           <8.0                   0- 6            7.5-8.5   American Diabetes Association. Diabetes Care 33(S1), Jan 2010.     05/22/2019 5.0 % Final     Comment:          Diagnosis of Diabetes-Adults   Non-Diabetic: < or = 5.6%   Increased risk for developing diabetes: 5.7-6.4%   Diagnostic of diabetes: > or = 6.5%  .       Monitoring of Diabetes                Age (y)     Therapeutic Goal (%)   Adults:          >18            <7.0   Pediatrics:    13-18           <7.5                   7-12           <8.0                   0- 6            7.5-8.5   American Diabetes Association. Diabetes Care 33(S1), Jan 2010.       Lab Results   Component Value Date    WBC 17.1 (H) 12/15/2023    HGB 9.8 (L) 12/15/2023    HCT 29.9 (L) 12/15/2023     12/15/2023    CHOL 96 07/08/2022    TRIG 49 07/08/2022    HDL 37.8 (A) 07/08/2022    ALT 23 12/14/2023    AST 51 (H) 12/14/2023     12/15/2023    K 3.7 12/15/2023     12/15/2023    CREATININE 0.75 12/15/2023    BUN 34 (H) 12/15/2023    CO2 23 12/15/2023    TSH 18.83 (H) 09/16/2022    INR 2.2 (H) 12/13/2023    HGBA1C 5.9 (A) 07/08/2022          FL modified barium swallow study  Narrative: Interpreted By:  Minerva Willis and Nelson Camree   STUDY:  FL MODIFIED BARIUM SWALLOW STUDY;; 12/14/2023 1:45 pm      INDICATION:  Signs/Symptoms:concern for aspiration pneumonia, failed bedside  swallow eval w/ SLP.      COMPARISON:  CT head dated 05/07/2023.      ACCESSION NUMBER(S):  [id number]      ORDERING CLINICIAN:  MARYCARMEN HAYNES      TECHNIQUE:  MBSS completed. Informed verbal consent obtained prior to completion  of exam. Trials of thin, nectar thick, honey thick, puree,  soft-solids, and regular solids given.      SLP: Jamal Benitez  Phone/Pager: [phone]  Fluoroscopy time: 5.2 minutes.      SPEECH FINDINGS:  Speech-Language Pathology  Adult Inpatient Modified Barium Swallow Study (MBSS)      Patient Name: Nena Ojeda  MRN: [id number]  Today's Date: 12/14/2023  Start Time: 1300  Stop Time: 1345  Time Calculation (min): 45      Initial MBSS: Yes      Respiratory Status: nasal cannula, NAD      History of Present Illness:  Nena Ojeda is a 83 y.o. female being admitted to the MICU for  AHRF requiring AirVO. PMHx notable for autonomic orthostatic  hypotension, emphysema (chart dx of COPD w/o PFT). HFpEF?, acid  reflux disease, rheumatoid osteoarthritis, hypertension,  dyslipidemia, ,  urinary incontinence, hypothyroidism, moderate  severity dementia, and previous aspiration PNA. On arrival, patient  ill appearing and not interactive. History obtained from daughter  Annabelle. Patient resides at long term assisted living ([facility]) and  presented to the ED this evening because she felt exceedingly tired  at her meal and found to have low pulse oximetry reading. Daughter  reports that she visits her mother once per week. Over the past 2  weeks patient with cough and feeling tired. No report of fever, SOB  or chest pain. Not aware of any issues swallowing in the past. Also  reports patient with worsening dementia over the past year (MOCA 28  to 18) and increasing need for assistance (I.e. walker to wheelchair).      Impression: Impression:  Modified Barium Swallow Study completed. Verbal consent obtained. Pt  alert, cooperative, and able to follow commands throughout study.      Pt with severe oropharyngeal dysphagia. Trials of thin liquids,  nectar (mildly) thick liquids, honey (moderately) thick liquids,  purees, and regular solids were given. Pt with adequate oral  acceptance of all consistencies/trials. Impaired oral management of  purees and regular solids e/b significantly prolonged bolus  manipulation/mastication, lingual pumping, piecemeal swallow, and  incomplete oral clearance. Poor oral containment of thin, nectar  (mildly), and honey (moderately) thick liquids.      SILENT aspiration of thin liquids via tsp 2/2 premature spillage to  the pyriforms and significantly delayed pharyngeal swallow initiation  and incomplete epiglottic inversion/laryngeal vestibular closure.  Cues to cough unsuccessful in clearing material from laryngeal  vestibule. Pharyngeal deficits reduplicated with nectar (mildly)  thick liquids via tsp resulting in penetration with visible residue  on the underside of the epiglottis. Material was not sensed by pt and  accumulated with additional tsp sip trials of nectar  (mildly) thick  liquids, eventually resulting in SILENT aspiration. Consistent  premature spillage of honey (moderately) thick liquids to the  valleculae. With initial trials of honey (moderately) thick liquids,  pt with improved timeliness of pharyngeal swallow  initiation/laryngeal vestibular closure resulting in only trace  transient penetration. However, as trials progressed, pt with  apparent fatigue e/b deep penetration of honey (moderately) thick  liquids with visible residue on the underside of the epiglottis. Pt  did not sense penetration and made no attempt to clear, putting her  at risk of aspiration of honey (moderately) thick liquids. No  penetration/aspiration with purees or regular solids.      Recommend NPO with frequent aggressive oral care. 3-5 ice chips/hr  allowed for pleasure, safe swallow stimulation, and after oral care  to prevent buildup of bacteria within the oropharynx. Suspect pt's  dysphagia at baseline given chronic condition of COPD. Question need  for GOC discussions re: accepting the risk of aspiration with oral  intake. SLP will continue to follow pending GOC discussions.      [place]:  Thin: 8 - Material enters airway, passes below the folds, no effort  to eject (no cough). Nectar: 8 - Material enters airway, passes below  the folds, no effort to eject (no cough). Honey: 5 - Material enters  airway, contacts the folds, not ejected from airway. Puree: 1 -  Material does not enter airway. Solids: 1 - Material does not enter  airway.      Recommendations:  NPO with frequent aggressive oral care.  . 3-5 ice chips/hr allowed for pleasure, safe swallow stimulation,  and after oral care to prevent buildup of bacteria within the  oropharynx . Question need for ongoing GOC discussions re: accepting  the risk of aspiration with oral intake . SLP will continue to follow  pending GOC discussions      Goal:  Pt will tolerate least restrictive diet with no overt clinical s/s  aspiration 100%  of the time.          Plan:  SLP Services Indicated: Yes  Frequency: pending GOC discussions  Discussed POC with patient  SLP - OK to Discharge      Pain:  0-10  0 = No pain.      Inpatient Education:  Extensive education provided to patient regarding current swallow  function, recommendations/results, and POC.      Consultations/Referrals/Coordination of Services:  GOC discussions          Speech Therapy section of this report signed by Jamal Benitez on  12/14/2023 at 3:22 pm.      RADIOLOGY FINDINGS:  Multilevel degenerative changes of the visualized spine.          Radiology section of this report signed by Dr. Minerva Willis.      IMPRESSION:  Please refer to swallow study results as detailed by speech  pathologist. No radiographic evidence of acute osseous abnormality. .      I personally reviewed the images/study and I agree with the findings  as stated by Dr. Austyn Victor. This study was interpreted at  [place], [place].      Signed by: Minerva Willis 12/17/2023 4:22 PM  Dictation workstation:   [id number]      DATA:  EKG: QTC  Encounter Date: 12/13/23   ECG 12 Lead   Result Value    Ventricular Rate 97    Atrial Rate 97    OK Interval 130    QRS Duration 66    QT Interval 324    QTC Calculation(Bazett) 411    P Axis 66    R Axis 0    T Axis 71    QRS Count 16    Q Onset 225    P Onset 160    P Offset 209    T Offset 387    QTC Fredericia 380    Narrative    Normal sinus rhythm  Normal ECG  When compared with ECG of 19-FEB-2022 21:00,  Previous ECG has undetermined rhythm, needs review    See ED provider note for full interpretation and clinical correlation  Confirmed by Wojciech Seth ([id number]) on 12/14/2023 5:23:25 AM      Anti-psychotics in 48 hours:  Opioids/Benzodiazepines in 48 hours:  Anticholinergics on board:No  Restraints:No  Indwelling catheters:No  Last BM: 12/17/23  UO in 24 hours:  Activity in the past 24 hours:none  Need for ambulatory devices: none               Assessment/Plan   Nena Ojeda is a 83 y.o. female on day 5 of admission presenting with Multifocal pneumonia.    Principal Problem:    Multifocal pneumonia    Nena Ojeda is a 83 y.o. female PMHx notable for autonomic orthostatic hypotension, emphysema (chart dx of COPD w/o PFT). HFpEF, GERD, rheumatoid osteoarthritis, hypertension, dyslipidemia, urinary incontinence, hypothyroidism, moderate severity dementia, and previous aspiration PNA previously admitted to the MICU for AHRF requiring AirVO. Geriatrics consulted after failed MBSS and GOC discussions.   Given worsening cognitive status impacting all IADLs and most BADLs and history of aspiration PNA requiring ICU admission, POA (rebeccaer, Annabelle) would like to proceed with comfort care measures.      #Vascular dementia, moderate severity with possibility comorbid Alzheimer's disease  #Depression, On Cymbalta, Zoloft and Bupropion  :: Followed by Dr. Camarillo, last [facility] 16/30 2022  - Worsening cognitive status per daughter and last outpatient geriatric psychiatry note  - Per daughter, patient is dependent for all IADLs and most BADLs besides feeding, lives in [facility] assisted living, is a total care.   - Per discussion with Annabelle (daughter, POA), would like to proceed with comfort care measures given patient's worsening cognitive status      Please use delirium precautions  -Bright lights during the day, keeps blinds up, switch all lights on   -Avoid disturbances at night. Encourage at least 6 hours uninterrupted sleep. Consider d/c 4am vitals check  -Avoid benzodiazepines, sedatives. Minimize opioids   -Avoid anti-cholinergics    -Avoid restraints. D/c perea if possible.   -Use low dose haldol 0.5mg PO (IM if PO not possible) only PRN severe agitation where pt exhibits volatile behavior and is a threat to self or others. EKGs to monitor Qtc.  -Daily orientation to time and place by the staff   -Out of bed to chair few hours everyday  -Encourage  stimulating activities during the day if possible       #Aphthous ulcers  - recommend aggressive mouth care  - recommend BMX mouthwash  - continue to monitor     #Multi-focal PNA, improving    #Hx of Aspiration PNA  #Dysphagia, failed MBSS in the setting of dementia   -Failed MBSS, Geriatrics consulted for GOC  - Per discussion with Annabelle (daughter, POA), would like to proceed with comfort care measures given patient's worsening cognitive status   -Per discussion with Annabelle (POA), she is agreeable to proceed with pleasure feeds, currently CODE STATUS DNR/DNI   -Awaiting for further hospice placement at this time   -Agree with C/w CTX and azithromycin at this time      #Constipation  :: Last BM 12/17  - Recommend bowel regimen with docusate and senna   - Recommend daily recording of bowel movements, aim for 1-2 Bm daily      #HTN  #HFpEF, EF 65% 7/2022   ::echo 07/2022: EF 65%, LA dilation, moderate aortic stenosis  -Hold losartan and diuretic I/s/o of WALESKA  -Agree with keeping patient net even daily      #Emphysema   ::Present on previous CT, chart diagnosis of COPD however no PFT  ::VBG negative for hypercapia  -C/w home inhaler, if does not improve can consider starting duonebs      #Hypothyroidism   - c/w home synthroid      Medication Evaluation:   High risk medications: Zoloft, Cymbalta, Bupropion, fesoterodine - recommend holding these meds at discharge. Patient has not received them since being admitted.   Other concerning issues regarding medications: None      Care Transitions:  -Recommended level for discharge: Hospice care on discharge   -Home going considerations: Hospice care, would need to finish antibiotics      Goals of Care:  -Primary goals: Comfort care   -Health care power of : Annabelle, daughter   -Living will:Yes  Code status: DNR DNI         Above recommendations not final until attested by attending physician, Dr. Armstrong.        Geriatric medicine will continue to follow the patient. Thank  you for allowing geriatric medicine to be involved in the care of your patient. Geriatric medicine consultation team is available during work hours Monday through Friday. For any emergency issues requiring immediate assistance over the weekend, please page Geriatrics pager [phone]        Molly Hough MD

## 2023-12-18 NOTE — DISCHARGE SUMMARY
Date of Admission: 12/13/2023    Date of Discharge: 12/18/2023    Discharge Diagnosis  Multifocal pneumonia  Acute respiratory failure with hypoxemia  Dysphagia with aspiration  Moderate dementia  Chronic pain      Discharge Meds     Your medication list        START taking these medications        Instructions Last Dose Given Next Dose Due   ipratropium-albuteroL 0.5-2.5 mg/3 mL nebulizer solution  Commonly known as: Duo-Neb      Take 3 mL by nebulization every 4 hours if needed for wheezing or shortness of breath.       LORazepam 0.5 mg tablet  Commonly known as: Ativan      Take 1 tablet (0.5 mg) by mouth every 6 hours if needed for anxiety (Anxiety or second line for air hunger) for up to 3 days.       lubricating eye drops ophthalmic solution      Administer 2 drops into both eyes 4 times a day.       oxyCODONE 5 mg immediate release tablet  Commonly known as: Roxicodone      Take 0.5 tablets (2.5 mg) by mouth every 4 hours if needed (mod-severe pain or air hunger) for up to 3 days.       oxygen gas therapy  Commonly known as: O2      Inhale 1 each once every 24 hours.       sennosides-docusate sodium 8.6-50 mg tablet  Commonly known as: Sis-Colace      Take 2 tablets by mouth once daily at bedtime.              CHANGE how you take these medications        Instructions Last Dose Given Next Dose Due   acetaminophen 325 mg tablet  Commonly known as: Tylenol  What changed: See the new instructions.      Take 2 tablets (650 mg) by mouth every 8 hours.              CONTINUE taking these medications        Instructions Last Dose Given Next Dose Due   albuterol-budesonide 90-80 mcg/actuation inhaler  Commonly known as: Airsupra           buPROPion  mg 12 hr tablet  Commonly known as: Wellbutrin SR           CeleBREX 200 mg capsule  Generic drug: celecoxib           dextromethorphan-guaifenesin  mg/10 mL liquid in packet           DULoxetine 20 mg DR capsule  Commonly known as: Cymbalta           Advair  Stage 6: Number Of Blocks?: 0 Diskus 500-50 mcg/dose diskus inhaler  Generic drug: fluticasone propion-salmeteroL           levothyroxine 125 mcg tablet  Commonly known as: Synthroid, Levoxyl           lidocaine 3 % cream           Lumigan 0.01 % ophthalmic solution  Generic drug: bimatoprost           melatonin 3 mg tablet           multivitamin with minerals tablet           omeprazole 40 mg DR capsule  Commonly known as: PriLOSEC           sertraline 50 mg tablet  Commonly known as: Zoloft           tiotropium 18 mcg inhalation capsule  Commonly known as: Spiriva           Toviaz 8 mg tablet extended release 24 hr  Generic drug: fesoterodine                  STOP taking these medications      acetaminophen-codeine 300-30 mg tablet  Commonly known as: Tylenol w/ Codeine #3        amLODIPine 5 mg tablet  Commonly known as: Norvasc        ASPIRIN ORAL        atorvastatin 80 mg tablet  Commonly known as: Lipitor        calcium-magnesium-zinc 333-133-5 mg tablet        docusate sodium 100 mg tablet  Commonly known as: Colace        estradiol 0.5 mg tablet  Commonly known as: Estrace        ezetimibe 10 mg tablet  Commonly known as: Zetia        furosemide 20 mg tablet  Commonly known as: Lasix        magnesium oxide 400 mg tablet  Commonly known as: Mag-Ox        polyethylene glycol 17 gram packet  Commonly known as: Glycolax, Miralax        potassium chloride CR 10 mEq ER tablet  Commonly known as: Klor-Con                  Where to Get Your Medications        These medications were sent to [facility]  [address], [address], [place] [address]      Hours: 8AM to 6PM Mon-Fri, 8AM to 4PM Sat, 9AM to 1PM Sun Phone: [phone]   acetaminophen 325 mg tablet  ipratropium-albuteroL 0.5-2.5 mg/3 mL nebulizer solution       You can get these medications from any pharmacy    Bring a paper prescription for each of these medications  LORazepam 0.5 mg tablet  oxyCODONE 5 mg immediate release tablet       Information about where to  get these medications is not yet available    Ask your nurse or doctor about these medications  lubricating eye drops ophthalmic solution  oxygen gas therapy  sennosides-docusate sodium 8.6-50 mg tablet         Test Results Pending At Discharge  Pending Labs       Order Current Status    Extra Urine Gray Tube Collected (12/14/23 1447)    Legionella Antigen, Urine Collected (12/14/23 1447)    Streptococcus pneumoniae Antigen, Urine Collected (12/14/23 1447)    Urinalysis with Reflex Microscopic and Culture Collected (12/14/23 1447)            Hospital Course  Nena Ojeda is a 83 y.o. female being admitted to the MICU for AHRF requiring AirVO.PMHx notable for autonomic orthostatic hypotension, emphysema (chart dx of COPD w/o PFT). HFpEF, acid reflux disease, rheumatoid osteoarthritis, hypertension, dyslipidemia, , urinary incontinence, hypothyroidism, moderate severity dementia, and previous aspiration PNA.   On arrival, patient ill appearing and not interactive. History obtained from daughter Annabelle. Patient resides at [facility] term [facility] ([facility]) and presented to the ED this evening because she felt exceedingly tired at her meal and found to have low pulse oximetry reading. Daughter reports that she visits her mother once per week. Over the past 2 weeks patient with cough and feeling tired. No report of fever, SOB or chest pain. Not aware of any issues swallowing in the past. Also reports patient with worsening dementia over the past year (MOCA 28 to 18) and increasing need for assistance (I.e. walker to wheelchair). In the MICU, patient was transitioned from AirVO to 4LNC, treated as an aspiration pneumonia with ceftriaxone/azithro and COPD exacerbation. Mental status back to baseline.  Had SLP evaluation and still with signs of aspiration, geriatrics involved for goals of care, after goals of care discussion family electing to consult hospice.  Social work/TCC involved, and time spent awaiting  disposition.  Patient continue to work on optimization of respiratory status with all precautions I-S, Acapella and in respiratory therapy, overall more comfortable and better handling her cough and sputum production.  Discharged with hospice..   Pertinent Physical Exam At Time of Discharge  On the day of discharge, the patient reported feeling well and pain was controlled. Vitals and labs were stable. On exam: No acute distress, interactive, no increased work of breathing, on nasal canula, good voluntary cough,  regular rate and rhythm, abdomen soft/nontender, no edema.    Outpatient Follow-Up  No future appointments.      Time spent >30 minutes on discharge management.    Nadeem Melgar MD

## 2023-12-18 NOTE — PROGRESS NOTES
Physical Therapy                 Therapy Communication Note    Patient Name: Nena Ojeda  MRN: [id number]  Today's Date: 12/18/2023     Discipline: Physical Therapy    Missed Visit Reason: Missed Visit Reason: Patient refused (pt declined wants to rest. pt to return to NH with hospice.)    Missed Time: Attempt    Comment:

## 2023-12-20 ENCOUNTER — APPOINTMENT (OUTPATIENT)
Dept: GERIATRIC MEDICINE | Facility: CLINIC | Age: 83
End: 2023-12-20
Payer: MEDICARE

## 2023-12-20 ENCOUNTER — APPOINTMENT (OUTPATIENT)
Dept: NEUROLOGY | Facility: CLINIC | Age: 83
End: 2023-12-20
Payer: MEDICARE